# Patient Record
Sex: FEMALE | Race: WHITE | NOT HISPANIC OR LATINO
[De-identification: names, ages, dates, MRNs, and addresses within clinical notes are randomized per-mention and may not be internally consistent; named-entity substitution may affect disease eponyms.]

---

## 2018-02-16 ENCOUNTER — TRANSCRIPTION ENCOUNTER (OUTPATIENT)
Age: 23
End: 2018-02-16

## 2020-09-25 ENCOUNTER — LABORATORY RESULT (OUTPATIENT)
Age: 25
End: 2020-09-25

## 2020-09-25 ENCOUNTER — APPOINTMENT (OUTPATIENT)
Dept: HEMATOLOGY ONCOLOGY | Facility: CLINIC | Age: 25
End: 2020-09-25
Payer: MEDICAID

## 2020-09-25 ENCOUNTER — TRANSCRIPTION ENCOUNTER (OUTPATIENT)
Age: 25
End: 2020-09-25

## 2020-09-25 VITALS
BODY MASS INDEX: 24.27 KG/M2 | RESPIRATION RATE: 14 BRPM | TEMPERATURE: 97.2 F | HEART RATE: 102 BPM | SYSTOLIC BLOOD PRESSURE: 130 MMHG | DIASTOLIC BLOOD PRESSURE: 88 MMHG | HEIGHT: 66 IN | WEIGHT: 151 LBS

## 2020-09-25 DIAGNOSIS — Z80.0 FAMILY HISTORY OF MALIGNANT NEOPLASM OF DIGESTIVE ORGANS: ICD-10-CM

## 2020-09-25 DIAGNOSIS — Z87.891 PERSONAL HISTORY OF NICOTINE DEPENDENCE: ICD-10-CM

## 2020-09-25 LAB
HCT VFR BLD CALC: 41.5 %
HGB BLD-MCNC: 14.2 G/DL
MCHC RBC-ENTMCNC: 29.8 PG
MCHC RBC-ENTMCNC: 34.2 G/DL
MCV RBC AUTO: 87 FL
PLATELET # BLD AUTO: 444 K/UL
PMV BLD: 9 FL
RBC # BLD: 4.77 M/UL
RBC # FLD: 12 %
WBC # FLD AUTO: 10.03 K/UL

## 2020-09-25 PROCEDURE — 99204 OFFICE O/P NEW MOD 45 MIN: CPT

## 2020-09-25 NOTE — HISTORY OF PRESENT ILLNESS
[de-identified] : Ms. AMINATA GARCIA is a 25 year old female here today for evaluation and management of thrombocytosis.\par \par She was referred by Dr. Stephens.  She has been following with him for about 3 years now.  Aminata is a 26yo F with PMHx including Ulcerative Colitis.  She states her PLTs were elevated for the last 2-3 blood checks but she was only just notified of this issue.  She denies any headaches, CP, palpitations or bleeding.  She is not on ASA.  She does report AUB in the form of menorrhagia, which is likely why her iron stores are borderline.  \par \par LAB WORKUP\par (9.4.2020) WBC 7.2, Hgb 12.4, MCV 91.4, , Vit B12 is 451, ironsat 26%, ferritin 35, TSH 0.64, Vit D is 23\par (2018) \par (2017)

## 2020-09-25 NOTE — REVIEW OF SYSTEMS
[Negative] : Allergic/Immunologic [Fever] : no fever [Chills] : no chills [Shortness Of Breath] : no shortness of breath [Skin Rash] : no skin rash [Confused] : no confusion [Dizziness] : no dizziness [Easy Bleeding] : no tendency for easy bleeding

## 2020-09-25 NOTE — CONSULT LETTER
[Dear  ___] : Dear  [unfilled], [Consult Letter:] : I had the pleasure of evaluating your patient, [unfilled]. [Please see my note below.] : Please see my note below. [Consult Closing:] : Thank you very much for allowing me to participate in the care of this patient.  If you have any questions, please do not hesitate to contact me. [Sincerely,] : Sincerely, [FreeTextEntry3] : John Ayala

## 2020-09-25 NOTE — ASSESSMENT
[FreeTextEntry1] : 26 yo woman with PMH of ulcerative colitis and menorrhagia has Thrombocytosis, , stable in 400s since at least 2017\par - consider rather reactive due to inflammation; will obtain mutaion labs : JAK2, CALR, MPL\par  - iron studes; von willebran disease w/u\par - discussed role of BMBx ; not needed at this time, will discuss further if her blood counts begin to worsen\par \par - US Abd ordered to r/o hepatosplenomegaly\par - labwork today including CBC, VWd panel, JAK2/CALR/MPL, Factor 8, ESR, CRP; requested labwork prior to 2017\par \par RTC in 1 month with CBC

## 2020-09-25 NOTE — REASON FOR VISIT
[Initial Consultation] : an initial consultation for [Blood Count Assessment] : blood count assessment [FreeTextEntry2] : High platelets

## 2020-09-26 LAB
CRP SERPL-MCNC: <0.1 MG/DL
ERYTHROCYTE [SEDIMENTATION RATE] IN BLOOD BY WESTERGREN METHOD: 5 MM/HR

## 2020-09-29 LAB — VWF AG PPP IA-ACNC: 85 %

## 2020-09-30 LAB
FACT VIII ACT/NOR PPP: 92 %
GENE XXX MUT ANL BLD/T: NORMAL

## 2020-10-02 LAB — JAK2RLX: NORMAL

## 2020-10-04 LAB
AMINO ACID MPL: NORMAL
ASSAY DETAILS MPL: NORMAL
BLOCK/SPECIMEN ID: NORMAL
EXON MPL: NORMAL
GENE MPL: NORMAL
INTERPRETATION: NORMAL
Lab: NORMAL
MPL EXON 10 MUTATION: NOT DETECTED
MPL SPECIMEN SOURCE: NORMAL
MUTATION TYPE: NORMAL
MUTFREQ: NORMAL
NUCCHA: NORMAL
REFERENCE MPL: NORMAL

## 2020-10-22 LAB — VWF MULTIMERS PPP IA-ACNC: NORMAL

## 2020-10-30 ENCOUNTER — OUTPATIENT (OUTPATIENT)
Dept: OUTPATIENT SERVICES | Facility: HOSPITAL | Age: 25
LOS: 1 days | Discharge: HOME | End: 2020-10-30

## 2020-10-30 ENCOUNTER — APPOINTMENT (OUTPATIENT)
Dept: HEMATOLOGY ONCOLOGY | Facility: CLINIC | Age: 25
End: 2020-10-30
Payer: MEDICAID

## 2020-10-30 VITALS
TEMPERATURE: 98 F | HEART RATE: 103 BPM | DIASTOLIC BLOOD PRESSURE: 83 MMHG | BODY MASS INDEX: 24.11 KG/M2 | RESPIRATION RATE: 14 BRPM | WEIGHT: 150 LBS | HEIGHT: 66 IN | SYSTOLIC BLOOD PRESSURE: 149 MMHG

## 2020-10-30 DIAGNOSIS — D47.3 ESSENTIAL (HEMORRHAGIC) THROMBOCYTHEMIA: ICD-10-CM

## 2020-10-30 LAB
RETICS # AUTO: 1 %
RETICS AGGREG/RBC NFR: 47 K/UL

## 2020-10-30 PROCEDURE — 99214 OFFICE O/P EST MOD 30 MIN: CPT

## 2020-10-30 NOTE — HISTORY OF PRESENT ILLNESS
[de-identified] : Ms. AMINATA GARCIA is a 25 year old female here today for evaluation and management of thrombocytosis.\par \par She was referred by Dr. Stephens.  She has been following with him for about 3 years now.  Aminata is a 24yo F with PMHx including Ulcerative Colitis.  She states her PLTs were elevated for the last 2-3 blood checks but she was only just notified of this issue.  She denies any headaches, CP, palpitations or bleeding.  She is not on ASA.  She does report AUB in the form of menorrhagia, which is likely why her iron stores are borderline.  \par \par LAB WORKUP\par (9.4.2020) WBC 7.2, Hgb 12.4, MCV 91.4, , Vit B12 is 451, ironsat 26%, ferritin 35, TSH 0.64, Vit D is 23\par (2018) \par (2017)  [de-identified] : 10/30/2020\par Patient is here for a follow-up visit for thrombocytosis, accompanied by family member.  She is feeling well with no new complaints.   Patient denies fever, chills, nausea, vomiting, CP or frequent headaches.  Reviewed most recent imaging which shows evidence of fatty liver.  She does report her regular heavy menses but no other bleeding.  \par US Abd (10.2.2020 - RR)IMPRESSION:  Hepatic steatosis. Otherwise, unremarkable exam.

## 2020-10-30 NOTE — REASON FOR VISIT
[Blood Count Assessment] : blood count assessment [Follow-Up Visit] : a follow-up visit for [FreeTextEntry2] : High platelets

## 2020-10-30 NOTE — REVIEW OF SYSTEMS
[Negative] : Allergic/Immunologic [Fever] : no fever [Chills] : no chills [Shortness Of Breath] : no shortness of breath [Skin Rash] : no skin rash [Confused] : no confusion [Dizziness] : no dizziness [Easy Bleeding] : no tendency for easy bleeding [de-identified] : heavy menses

## 2020-11-02 LAB
FERRITIN SERPL-MCNC: 48 NG/ML
IRON SATN MFR SERPL: 40 %
IRON SERPL-MCNC: 129 UG/DL
TIBC SERPL-MCNC: 321 UG/DL
UIBC SERPL-MCNC: 192 UG/DL

## 2020-11-18 ENCOUNTER — TRANSCRIPTION ENCOUNTER (OUTPATIENT)
Age: 25
End: 2020-11-18

## 2020-12-29 ENCOUNTER — TRANSCRIPTION ENCOUNTER (OUTPATIENT)
Age: 25
End: 2020-12-29

## 2021-01-08 ENCOUNTER — TRANSCRIPTION ENCOUNTER (OUTPATIENT)
Age: 26
End: 2021-01-08

## 2021-01-22 ENCOUNTER — TRANSCRIPTION ENCOUNTER (OUTPATIENT)
Age: 26
End: 2021-01-22

## 2021-01-22 ENCOUNTER — APPOINTMENT (OUTPATIENT)
Dept: HEMATOLOGY ONCOLOGY | Facility: CLINIC | Age: 26
End: 2021-01-22

## 2021-03-17 ENCOUNTER — APPOINTMENT (OUTPATIENT)
Dept: HEMATOLOGY ONCOLOGY | Facility: CLINIC | Age: 26
End: 2021-03-17

## 2021-03-22 ENCOUNTER — TRANSCRIPTION ENCOUNTER (OUTPATIENT)
Age: 26
End: 2021-03-22

## 2021-03-26 ENCOUNTER — TRANSCRIPTION ENCOUNTER (OUTPATIENT)
Age: 26
End: 2021-03-26

## 2022-06-07 ENCOUNTER — NON-APPOINTMENT (OUTPATIENT)
Age: 27
End: 2022-06-07

## 2022-06-20 ENCOUNTER — APPOINTMENT (OUTPATIENT)
Dept: OBGYN | Facility: CLINIC | Age: 27
End: 2022-06-20

## 2022-06-20 VITALS
HEIGHT: 66 IN | SYSTOLIC BLOOD PRESSURE: 149 MMHG | DIASTOLIC BLOOD PRESSURE: 91 MMHG | BODY MASS INDEX: 25.71 KG/M2 | WEIGHT: 160 LBS | HEART RATE: 134 BPM

## 2022-06-20 LAB
BILIRUB UR QL STRIP: NORMAL
CLARITY UR: CLEAR
COLLECTION METHOD: NORMAL
GLUCOSE UR-MCNC: NORMAL
HCG UR QL: 0.2 EU/DL
HGB UR QL STRIP.AUTO: NORMAL
KETONES UR-MCNC: NORMAL
LEUKOCYTE ESTERASE UR QL STRIP: NORMAL
NITRITE UR QL STRIP: NORMAL
PH UR STRIP: 5.5
PROT UR STRIP-MCNC: NORMAL
SP GR UR STRIP: 1.01

## 2022-06-20 PROCEDURE — 76830 TRANSVAGINAL US NON-OB: CPT

## 2022-06-20 PROCEDURE — 81003 URINALYSIS AUTO W/O SCOPE: CPT | Mod: QW

## 2022-06-20 PROCEDURE — 99385 PREV VISIT NEW AGE 18-39: CPT | Mod: 25

## 2022-06-20 NOTE — PROCEDURE
[Cervical Pap Smear] : cervical Pap smear [Liquid Base] : liquid base [GC & Chlamydia via Pap] : GC & Chlamydia via Pap [Tolerated Well] : the patient tolerated the procedure well [No Complications] : there were no complications [Fibroid Uterus] : fibroid uterus [Anteverted] : anteverted [L: ___ cm] : L: [unfilled] cm [FreeTextEntry5] : anterior fibroid 20.14cc  [FreeTextEntry7] : 3.98cc [FreeTextEntry8] : 3.58cc

## 2022-06-20 NOTE — HISTORY OF PRESENT ILLNESS
[FreeTextEntry1] : annual visit\par last visit 3 yrs ago [Y] : Patient is sexually active [Monogamous (Male Partner)] : is monogamous with a male partner [Safe Sex] : uses safe sex precautions [N] : Patient denies prior pregnancies [LMPDate] : 6/14/22 [MensesFreq] : 28 [MensesLength] : 7 [MensesAmount] : heavy [de-identified] : total # of partners (8) [Currently Active] : currently active [Men] : men [No] : No [Yes] : Yes [Condoms] : Condoms

## 2022-06-20 NOTE — DISCUSSION/SUMMARY
[FreeTextEntry1] : pt unaware she had fibroid, has not been to this office in 3 yrs\par follow up 6 months to check rate of growth\par pt suffers from severe anxiety, would not permit staff to retake her BP despite it being elevated \par she stated she is seeing her PCP tomorrow and he will check it, I suggested she try medication to help her with her anxiety

## 2022-06-20 NOTE — PHYSICAL EXAM
[Chaperone Present] : A chaperone was present in the examining room during all aspects of the physical examination [Appropriately responsive] : appropriately responsive [Alert] : alert [No Acute Distress] : no acute distress [Soft] : soft [Non-tender] : non-tender [Non-distended] : non-distended [Oriented x3] : oriented x3 [Labia Majora] : normal [Labia Minora] : normal [Moderate] : There was moderate vaginal bleeding [Normal] : normal [Uterine Adnexae] : normal

## 2022-06-23 LAB
C TRACH RRNA SPEC QL NAA+PROBE: NOT DETECTED
HPV HIGH+LOW RISK DNA PNL CVX: NOT DETECTED
N GONORRHOEA RRNA SPEC QL NAA+PROBE: NOT DETECTED
SOURCE AMPLIFICATION: NORMAL

## 2022-06-28 LAB — CYTOLOGY CVX/VAG DOC THIN PREP: NORMAL

## 2022-07-05 ENCOUNTER — APPOINTMENT (OUTPATIENT)
Dept: HEMATOLOGY ONCOLOGY | Facility: CLINIC | Age: 27
End: 2022-07-05

## 2022-07-05 ENCOUNTER — LABORATORY RESULT (OUTPATIENT)
Age: 27
End: 2022-07-05

## 2022-07-05 VITALS
DIASTOLIC BLOOD PRESSURE: 89 MMHG | RESPIRATION RATE: 16 BRPM | BODY MASS INDEX: 25.82 KG/M2 | WEIGHT: 160 LBS | HEART RATE: 131 BPM | TEMPERATURE: 97.2 F | SYSTOLIC BLOOD PRESSURE: 140 MMHG

## 2022-07-05 LAB
HCT VFR BLD CALC: 40.3 %
HGB BLD-MCNC: 13.9 G/DL
MCHC RBC-ENTMCNC: 29.8 PG
MCHC RBC-ENTMCNC: 34.5 G/DL
MCV RBC AUTO: 86.5 FL
PLATELET # BLD AUTO: 160 K/UL
PMV BLD: 10.5 FL
RBC # BLD: 4.66 M/UL
RBC # FLD: 12.2 %
WBC # FLD AUTO: 15.01 K/UL

## 2022-07-05 PROCEDURE — 99214 OFFICE O/P EST MOD 30 MIN: CPT

## 2022-07-05 NOTE — ASSESSMENT
[FreeTextEntry1] : 27 yo woman with PMH of ulcerative colitis and menorrhagia has Thrombocytosis, stable in 400s since at least 2017. \par - JAK2, CALR, MPL negative.  \par - consider reactive due to inflammation\par - reviewed CBC which shows normalization of PLTs , but with mild neutrophil-predoninant leukocytosis \par - discussed role of BMBx ; not needed at this time, will discuss further if her blood counts begin to worsen\par - US Abd from 09/2020 suggestive of hepatic steatosis; given her young age and questionable FH of fatty liver, she went to [facility] for hepatology evaluation where she was reportedly confirmed she does not have fatty liver\par - again, requested labwork prior to 2017 \par - Labwork today:  iron studies, ferritin level \par \par RTC in 6 months with CBC, iron studies, ferritin level 2 days prior\par \par seen/examined w/ NP King;note reviewed; case dscussed\par Thromocytosis appears to be reactive; f/u in 6 mos\par Leukocytosis appears to be reactive, f/u in 6 mos\par will check iron labs and replace accordingly

## 2022-07-05 NOTE — REVIEW OF SYSTEMS
[Negative] : Allergic/Immunologic [Fever] : no fever [Chills] : no chills [Shortness Of Breath] : no shortness of breath [Skin Rash] : no skin rash [Confused] : no confusion [Dizziness] : no dizziness [Easy Bleeding] : no tendency for easy bleeding [de-identified] : heavy menses

## 2022-07-05 NOTE — REASON FOR VISIT
[Follow-Up Visit] : a follow-up visit for [Blood Count Assessment] : blood count assessment [FreeTextEntry2] : High platelets

## 2022-07-05 NOTE — HISTORY OF PRESENT ILLNESS
[de-identified] : Ms. AMINATA GARCIA is a 25 year old female here today for evaluation and management of thrombocytosis.\par \par She was referred by Dr. Stephens.  She has been following with him for about 3 years now.  Aminata is a 26yo F with PMHx including Ulcerative Colitis.  She states her PLTs were elevated for the last 2-3 blood checks but she was only just notified of this issue.  She denies any headaches, CP, palpitations or bleeding.  She is not on ASA.  She does report AUB in the form of menorrhagia, which is likely why her iron stores are borderline.  \par \par LAB WORKUP\par (9.4.2020) WBC 7.2, Hgb 12.4, MCV 91.4, , Vit B12 is 451, ironsat 26%, ferritin 35, TSH 0.64, Vit D is 23\par (2018) \par (2017)  [de-identified] : 10/30/2020\par Patient is here for a follow-up visit for thrombocytosis, accompanied by family member.  She is feeling well with no new complaints.   Patient denies fever, chills, nausea, vomiting, CP or frequent headaches.  Reviewed most recent imaging which shows evidence of fatty liver.  She does report her regular heavy menses but no other bleeding.  \par US Abd (10.2.2020 - RR)IMPRESSION:  Hepatic steatosis. Otherwise, unremarkable exam.\par \par 7/5/22\par Patient is here for a follow-up visit for thrombocytosis, accompanied by mother.  Reviewed most recent CBC which shows neutrophil-predominant leukocytosis without evidence of thrombocytosis.  She states that 2 weeks ago she had PLT count up to 547K about 2 weeks ago with PCP.  She follows with GYN who recently diagnosed her with uterine fibroids.  Last UC flare was approximately 4 months ago.  Patient denies fever, chills, nausea, vomiting, CP or frequent headaches.  She still endorses AUB with menorrhagia , changes tampon up to 8x/day.

## 2022-07-06 LAB
FERRITIN SERPL-MCNC: 22 NG/ML
IRON SATN MFR SERPL: 13 %
IRON SERPL-MCNC: 47 UG/DL
TIBC SERPL-MCNC: 352 UG/DL
UIBC SERPL-MCNC: 305 UG/DL

## 2022-07-13 ENCOUNTER — APPOINTMENT (OUTPATIENT)
Dept: INFUSION THERAPY | Facility: CLINIC | Age: 27
End: 2022-07-13

## 2022-07-13 RX ORDER — IRON SUCROSE 20 MG/ML
200 INJECTION, SOLUTION INTRAVENOUS ONCE
Refills: 0 | Status: COMPLETED | OUTPATIENT
Start: 2022-07-13 | End: 2022-07-13

## 2022-07-13 RX ADMIN — IRON SUCROSE 220 MILLIGRAM(S): 20 INJECTION, SOLUTION INTRAVENOUS at 09:05

## 2022-07-13 RX ADMIN — IRON SUCROSE 200 MILLIGRAM(S): 20 INJECTION, SOLUTION INTRAVENOUS at 09:35

## 2022-07-20 ENCOUNTER — APPOINTMENT (OUTPATIENT)
Dept: INFUSION THERAPY | Facility: CLINIC | Age: 27
End: 2022-07-20

## 2022-07-20 RX ORDER — IRON SUCROSE 20 MG/ML
200 INJECTION, SOLUTION INTRAVENOUS ONCE
Refills: 0 | Status: COMPLETED | OUTPATIENT
Start: 2022-07-20 | End: 2022-07-20

## 2022-07-20 RX ADMIN — IRON SUCROSE 220 MILLIGRAM(S): 20 INJECTION, SOLUTION INTRAVENOUS at 09:14

## 2022-07-27 ENCOUNTER — APPOINTMENT (OUTPATIENT)
Dept: INFUSION THERAPY | Facility: CLINIC | Age: 27
End: 2022-07-27

## 2022-07-27 RX ORDER — IRON SUCROSE 20 MG/ML
200 INJECTION, SOLUTION INTRAVENOUS ONCE
Refills: 0 | Status: COMPLETED | OUTPATIENT
Start: 2022-07-27 | End: 2022-07-27

## 2022-07-27 RX ADMIN — IRON SUCROSE 220 MILLIGRAM(S): 20 INJECTION, SOLUTION INTRAVENOUS at 09:42

## 2022-08-17 ENCOUNTER — APPOINTMENT (OUTPATIENT)
Dept: INFUSION THERAPY | Facility: CLINIC | Age: 27
End: 2022-08-17

## 2022-08-17 RX ORDER — IRON SUCROSE 20 MG/ML
200 INJECTION, SOLUTION INTRAVENOUS ONCE
Refills: 0 | Status: COMPLETED | OUTPATIENT
Start: 2022-08-17 | End: 2022-08-17

## 2022-08-17 RX ADMIN — IRON SUCROSE 220 MILLIGRAM(S): 20 INJECTION, SOLUTION INTRAVENOUS at 08:57

## 2022-08-24 ENCOUNTER — APPOINTMENT (OUTPATIENT)
Dept: INFUSION THERAPY | Facility: CLINIC | Age: 27
End: 2022-08-24

## 2022-08-24 VITALS
RESPIRATION RATE: 18 BRPM | DIASTOLIC BLOOD PRESSURE: 72 MMHG | HEART RATE: 103 BPM | TEMPERATURE: 97.4 F | SYSTOLIC BLOOD PRESSURE: 136 MMHG

## 2022-08-24 RX ORDER — IRON SUCROSE 20 MG/ML
200 INJECTION, SOLUTION INTRAVENOUS ONCE
Refills: 0 | Status: COMPLETED | OUTPATIENT
Start: 2022-08-24 | End: 2022-08-24

## 2022-08-24 RX ADMIN — IRON SUCROSE 220 MILLIGRAM(S): 20 INJECTION, SOLUTION INTRAVENOUS at 09:41

## 2022-08-24 RX ADMIN — IRON SUCROSE 200 MILLIGRAM(S): 20 INJECTION, SOLUTION INTRAVENOUS at 10:35

## 2022-09-26 ENCOUNTER — APPOINTMENT (OUTPATIENT)
Dept: HEMATOLOGY ONCOLOGY | Facility: CLINIC | Age: 27
End: 2022-09-26

## 2022-09-27 LAB
BASOPHILS # BLD AUTO: 0.08 K/UL
BASOPHILS NFR BLD AUTO: 1.1 %
EOSINOPHIL # BLD AUTO: 0.37 K/UL
EOSINOPHIL NFR BLD AUTO: 5.3 %
FERRITIN SERPL-MCNC: 243 NG/ML
HCT VFR BLD CALC: 38.7 %
HGB BLD-MCNC: 13.5 G/DL
IMM GRANULOCYTES NFR BLD AUTO: 0.4 %
IRON SATN MFR SERPL: 45 %
IRON SERPL-MCNC: 130 UG/DL
LYMPHOCYTES # BLD AUTO: 2.6 K/UL
LYMPHOCYTES NFR BLD AUTO: 37.2 %
MAN DIFF?: NORMAL
MCHC RBC-ENTMCNC: 30.3 PG
MCHC RBC-ENTMCNC: 34.9 G/DL
MCV RBC AUTO: 87 FL
MONOCYTES # BLD AUTO: 0.65 K/UL
MONOCYTES NFR BLD AUTO: 9.3 %
NEUTROPHILS # BLD AUTO: 3.26 K/UL
NEUTROPHILS NFR BLD AUTO: 46.7 %
PLATELET # BLD AUTO: 437 K/UL
RBC # BLD: 4.45 M/UL
RBC # FLD: 13.2 %
TIBC SERPL-MCNC: 289 UG/DL
UIBC SERPL-MCNC: 159 UG/DL
WBC # FLD AUTO: 6.99 K/UL

## 2022-09-28 ENCOUNTER — OUTPATIENT (OUTPATIENT)
Dept: OUTPATIENT SERVICES | Facility: HOSPITAL | Age: 27
LOS: 1 days | Discharge: HOME | End: 2022-09-28

## 2022-09-28 ENCOUNTER — APPOINTMENT (OUTPATIENT)
Dept: HEMATOLOGY ONCOLOGY | Facility: CLINIC | Age: 27
End: 2022-09-28

## 2022-09-28 VITALS
DIASTOLIC BLOOD PRESSURE: 87 MMHG | WEIGHT: 160 LBS | SYSTOLIC BLOOD PRESSURE: 153 MMHG | HEART RATE: 131 BPM | HEIGHT: 66 IN | TEMPERATURE: 97 F | BODY MASS INDEX: 25.71 KG/M2

## 2022-09-28 DIAGNOSIS — D75.839 THROMBOCYTOSIS, UNSPECIFIED: ICD-10-CM

## 2022-09-28 PROCEDURE — 99214 OFFICE O/P EST MOD 30 MIN: CPT

## 2022-09-28 NOTE — HISTORY OF PRESENT ILLNESS
[de-identified] : Ms. AMINATA GARCIA is a 25 year old female here today for evaluation and management of thrombocytosis.\par \par She was referred by Dr. Stephens.  She has been following with him for about 3 years now.  Aminata is a 24yo F with PMHx including Ulcerative Colitis.  She states her PLTs were elevated for the last 2-3 blood checks but she was only just notified of this issue.  She denies any headaches, CP, palpitations or bleeding.  She is not on ASA.  She does report AUB in the form of menorrhagia, which is likely why her iron stores are borderline.  \par \par LAB WORKUP\par (9.4.2020) WBC 7.2, Hgb 12.4, MCV 91.4, , Vit B12 is 451, ironsat 26%, ferritin 35, TSH 0.64, Vit D is 23\par (2018) \par (2017)  [de-identified] : 10/30/2020\par Patient is here for a follow-up visit for thrombocytosis, accompanied by family member.  She is feeling well with no new complaints.   Patient denies fever, chills, nausea, vomiting, CP or frequent headaches.  Reviewed most recent imaging which shows evidence of fatty liver.  She does report her regular heavy menses but no other bleeding.  \par US Abd (10.2.2020 - RR)IMPRESSION:  Hepatic steatosis. Otherwise, unremarkable exam.\par \par 7/5/22\par Patient is here for a follow-up visit for thrombocytosis, accompanied by mother.  Reviewed most recent CBC which shows neutrophil-predominant leukocytosis without evidence of thrombocytosis.  She states that 2 weeks ago she had PLT count up to 547K about 2 weeks ago with PCP.  She follows with GYN who recently diagnosed her with uterine fibroids.  Last UC flare was approximately 4 months ago.  Patient denies fever, chills, nausea, vomiting, CP or frequent headaches.  She still endorses AUB with menorrhagia , changes tampon up to 8x/day.

## 2022-09-28 NOTE — ASSESSMENT
[FreeTextEntry1] : 25 yo woman with PMH of ulcerative colitis and menorrhagia has Thrombocytosis, stable in 400s since at least 2017. \par - JAK2, CALR, MPL negative.  \par - consider reactive due to inflammation\par - reviewed CBC which shows normalization of PLTs , but with mild neutrophil-predoninant leukocytosis \par - discussed role of BMBx ; not needed at this time, will discuss further if her blood counts begin to worsen\par - US Abd from 09/2020 suggestive of hepatic steatosis; given her young age and questionable FH of fatty liver, she went to [facility] for hepatology evaluation where she was reportedly confirmed she does not have fatty liver\par - again, requested labwork prior to 2017 \par - Labwork today:  iron studies, ferritin level \par \par \par Thromocytosis appears to be reactive; f/u in 3 mos\par Leukocytosis appears to be reactive, f/u in 3 mos\par iron deficiency due to menorrhagia secondary to fibroid: s/p iron IV with good response; needs to see GYN to discuss options of management

## 2022-09-28 NOTE — REVIEW OF SYSTEMS
[Negative] : Allergic/Immunologic [Fever] : no fever [Chills] : no chills [Shortness Of Breath] : no shortness of breath [Skin Rash] : no skin rash [Confused] : no confusion [Dizziness] : no dizziness [Easy Bleeding] : no tendency for easy bleeding [de-identified] : heavy menses

## 2022-11-28 ENCOUNTER — APPOINTMENT (OUTPATIENT)
Dept: HEMATOLOGY ONCOLOGY | Facility: CLINIC | Age: 27
End: 2022-11-28

## 2022-11-28 DIAGNOSIS — Z00.00 ENCOUNTER FOR GENERAL ADULT MEDICAL EXAMINATION W/OUT ABNORMAL FINDINGS: ICD-10-CM

## 2022-11-29 LAB
BASOPHILS # BLD AUTO: 0.08 K/UL
BASOPHILS NFR BLD AUTO: 0.9 %
EOSINOPHIL # BLD AUTO: 0.36 K/UL
EOSINOPHIL NFR BLD AUTO: 4.1 %
FERRITIN SERPL-MCNC: 159 NG/ML
HCT VFR BLD CALC: 38.8 %
HGB BLD-MCNC: 12.8 G/DL
IMM GRANULOCYTES NFR BLD AUTO: 0.2 %
IRON SATN MFR SERPL: 37 %
IRON SERPL-MCNC: 112 UG/DL
LYMPHOCYTES # BLD AUTO: 2.65 K/UL
LYMPHOCYTES NFR BLD AUTO: 29.9 %
MAN DIFF?: NORMAL
MCHC RBC-ENTMCNC: 29.4 PG
MCHC RBC-ENTMCNC: 33 G/DL
MCV RBC AUTO: 89.2 FL
MONOCYTES # BLD AUTO: 0.83 K/UL
MONOCYTES NFR BLD AUTO: 9.4 %
NEUTROPHILS # BLD AUTO: 4.92 K/UL
NEUTROPHILS NFR BLD AUTO: 55.5 %
PLATELET # BLD AUTO: 516 K/UL
RBC # BLD: 4.35 M/UL
RBC # FLD: 12.2 %
TIBC SERPL-MCNC: 301 UG/DL
UIBC SERPL-MCNC: 189 UG/DL
WBC # FLD AUTO: 8.86 K/UL

## 2022-12-01 ENCOUNTER — APPOINTMENT (OUTPATIENT)
Dept: HEMATOLOGY ONCOLOGY | Facility: CLINIC | Age: 27
End: 2022-12-01

## 2022-12-01 PROCEDURE — 99214 OFFICE O/P EST MOD 30 MIN: CPT | Mod: 95

## 2022-12-01 NOTE — REVIEW OF SYSTEMS
[Fever] : no fever [Chills] : no chills [Shortness Of Breath] : no shortness of breath [Skin Rash] : no skin rash [Confused] : no confusion [Dizziness] : no dizziness [Easy Bleeding] : no tendency for easy bleeding [Negative] : Allergic/Immunologic [de-identified] : heavy menses

## 2022-12-01 NOTE — REVIEW OF SYSTEMS
[Fever] : no fever [Chills] : no chills [Shortness Of Breath] : no shortness of breath [Skin Rash] : no skin rash [Confused] : no confusion [Dizziness] : no dizziness [Easy Bleeding] : no tendency for easy bleeding [Negative] : Allergic/Immunologic [de-identified] : heavy menses

## 2022-12-01 NOTE — HISTORY OF PRESENT ILLNESS
[de-identified] : Ms. AMINATA GARCIA is a 27 year old female here today for evaluation and management of thrombocytosis.\par \par She was referred by Dr. Stephens.  She has been following with him for about 3 years now.  Aminata is a 24yo F with PMHx including Ulcerative Colitis.  She states her PLTs were elevated for the last 2-3 blood checks but she was only just notified of this issue.  She denies any headaches, CP, palpitations or bleeding.  She is not on ASA.  She does report AUB in the form of menorrhagia, which is likely why her iron stores are borderline.  \par \par LAB WORKUP\par (9.4.2020) WBC 7.2, Hgb 12.4, MCV 91.4, , Vit B12 is 451, ironsat 26%, ferritin 35, TSH 0.64, Vit D is 23\par (2018) \par (2017)  [de-identified] : 10/30/2020\par Patient is here for a follow-up visit for thrombocytosis, accompanied by family member.  She is feeling well with no new complaints.   Patient denies fever, chills, nausea, vomiting, CP or frequent headaches.  Reviewed most recent imaging which shows evidence of fatty liver.  She does report her regular heavy menses but no other bleeding.  \par US Abd (10.2.2020 - RR)IMPRESSION:  Hepatic steatosis. Otherwise, unremarkable exam.\par \par 7/5/22\par Patient is here for a follow-up visit for thrombocytosis, accompanied by mother.  Reviewed most recent CBC which shows neutrophil-predominant leukocytosis without evidence of thrombocytosis.  She states that 2 weeks ago she had PLT count up to 547K about 2 weeks ago with PCP.  She follows with GYN who recently diagnosed her with uterine fibroids.  Last UC flare was approximately 4 months ago.  Patient denies fever, chills, nausea, vomiting, CP or frequent headaches.  She still endorses AUB with menorrhagia , changes tampon up to 8x/day.   [Home] : at home, [unfilled] , at the time of the visit. [Medical Office: ([facility])___] : at the medical office located in  [Verbal consent obtained from patient] : the patient, [unfilled]

## 2022-12-01 NOTE — HISTORY OF PRESENT ILLNESS
[de-identified] : Ms. AMINATA GARCIA is a 27 year old female here today for evaluation and management of thrombocytosis.\par \par She was referred by Dr. Stephens.  She has been following with him for about 3 years now.  Aminata is a 26yo F with PMHx including Ulcerative Colitis.  She states her PLTs were elevated for the last 2-3 blood checks but she was only just notified of this issue.  She denies any headaches, CP, palpitations or bleeding.  She is not on ASA.  She does report AUB in the form of menorrhagia, which is likely why her iron stores are borderline.  \par \par LAB WORKUP\par (9.4.2020) WBC 7.2, Hgb 12.4, MCV 91.4, , Vit B12 is 451, ironsat 26%, ferritin 35, TSH 0.64, Vit D is 23\par (2018) \par (2017)  [de-identified] : 10/30/2020\par Patient is here for a follow-up visit for thrombocytosis, accompanied by family member.  She is feeling well with no new complaints.   Patient denies fever, chills, nausea, vomiting, CP or frequent headaches.  Reviewed most recent imaging which shows evidence of fatty liver.  She does report her regular heavy menses but no other bleeding.  \par US Abd (10.2.2020 - RR)IMPRESSION:  Hepatic steatosis. Otherwise, unremarkable exam.\par \par 7/5/22\par Patient is here for a follow-up visit for thrombocytosis, accompanied by mother.  Reviewed most recent CBC which shows neutrophil-predominant leukocytosis without evidence of thrombocytosis.  She states that 2 weeks ago she had PLT count up to 547K about 2 weeks ago with PCP.  She follows with GYN who recently diagnosed her with uterine fibroids.  Last UC flare was approximately 4 months ago.  Patient denies fever, chills, nausea, vomiting, CP or frequent headaches.  She still endorses AUB with menorrhagia , changes tampon up to 8x/day.   [Home] : at home, [unfilled] , at the time of the visit. [Medical Office: ([facility])___] : at the medical office located in  [Verbal consent obtained from patient] : the patient, [unfilled]

## 2022-12-01 NOTE — CONSULT LETTER
[Dear  ___] : Dear  [unfilled], [Consult Letter:] : I had the pleasure of evaluating your patient, [unfilled]. [Please see my note below.] : Please see my note below. [Consult Closing:] : Thank you very much for allowing me to participate in the care of this patient.  If you have any questions, please do not hesitate to contact me. [Sincerely,] : Sincerely, [FreeTextEntry3] : John Ayala DO\par Attending Physician,\par Hematology/ Medical Oncology\par [phone] office\par \par

## 2022-12-01 NOTE — CONSULT LETTER
[Dear  ___] : Dear  [unfilled], [Consult Letter:] : I had the pleasure of evaluating your patient, [unfilled]. [Please see my note below.] : Please see my note below. [Consult Closing:] : Thank you very much for allowing me to participate in the care of this patient.  If you have any questions, please do not hesitate to contact me. [Sincerely,] : Sincerely, [FreeTextEntry3] : John Ayala DO\par Attending Physician,\par Hematology/ Medical Oncology\par [phone] office\par \par

## 2022-12-01 NOTE — ASSESSMENT
[FreeTextEntry1] : 26 yo woman with PMH of ulcerative colitis and menorrhagia has Thrombocytosis, stable in 400s since at least 2017. \par - JAK2, CALR, MPL negative.  \par - consider reactive due to inflammation\par \par - discussed role of BMBx ; not needed at this time, will discuss further if her blood counts begin to worsen\par \par - US Abd from 09/2020 suggestive of hepatic steatosis; given her young age and questionable FH of fatty liver, she went to [facility] for hepatology evaluation : FIBROSCAN done 04/2021 and showed E5.1, , IQR 14%\par \par \par Thromocytosis appears to be reactive; f/u in 3 mos\par Leukocytosis appears to be reactive, f/u in 3 mos\par iron deficiency due to menorrhagia secondary to fibroid: s/p iron IV with good response; needs to see GYN to discuss options of management\par \par PLAN \par - labs in 6 weeks (2 weeks after the menstrual period\par - labs in 10 weeks to see the CBC trend

## 2022-12-02 LAB — METHYLMALONATE SERPL-SCNC: 181 NMOL/L

## 2022-12-19 ENCOUNTER — APPOINTMENT (OUTPATIENT)
Dept: OBGYN | Facility: CLINIC | Age: 27
End: 2022-12-19

## 2022-12-19 VITALS
DIASTOLIC BLOOD PRESSURE: 82 MMHG | WEIGHT: 160 LBS | BODY MASS INDEX: 25.71 KG/M2 | HEIGHT: 66 IN | SYSTOLIC BLOOD PRESSURE: 128 MMHG | HEART RATE: 101 BPM

## 2022-12-19 DIAGNOSIS — N92.0 EXCESSIVE AND FREQUENT MENSTRUATION WITH REGULAR CYCLE: ICD-10-CM

## 2022-12-19 PROCEDURE — 99213 OFFICE O/P EST LOW 20 MIN: CPT | Mod: 25

## 2022-12-19 PROCEDURE — 76830 TRANSVAGINAL US NON-OB: CPT

## 2022-12-19 NOTE — DISCUSSION/SUMMARY
[FreeTextEntry1] : stable appearing fibroid, no significant growth since last visit 6 months ago\par discussed Mirena IUD to manage menorrhagia but pt declined at todays visit, pt states she can "deal with the heavy bleeding" \par seems very concerned about the etiology of her thrombocytosis \par pt being followed by HEM and GI for UC\par instructed to call office if she becomes symptomatic, pain , presssure

## 2022-12-19 NOTE — HISTORY OF PRESENT ILLNESS
[FreeTextEntry1] : pt presents for follow up visit, measurement of fibroid\par pt is asymptomatic, fibroid is anterior in location, denies pain, pressure or frequency of urination\par pt being followed by hematology for elevated platelets ( 450-470,000) \par  [Y] : Patient is sexually active [Monogamous (Male Partner)] : is monogamous with a male partner [Safe Sex] : uses safe sex precautions [N] : Patient denies prior pregnancies [LMPDate] : 11/21/22 [MensesFreq] : 28 [MensesLength] : 7 [MensesAmount] : heavy [de-identified] : total # of partners (8) [Regular Cycle Intervals] : periods have been regular [Currently Active] : currently active [Men] : men [No] : No [Yes] : Yes [Condoms] : Condoms

## 2022-12-19 NOTE — PROCEDURE
[Fibroid Uterus] : fibroid uterus [Abnormal Uterine Bleeding] : abnormal uterine bleeding [Transvaginal Ultrasound] : transvaginal ultrasound [Anteverted] : anteverted [FreeTextEntry5] : fibroid measured today 24.63 cc vol ( see report) [FreeTextEntry7] : NV [FreeTextEntry8] : 8.20cc

## 2022-12-19 NOTE — PHYSICAL EXAM
[Chaperone Present] : A chaperone was present in the examining room during all aspects of the physical examination [Appropriately responsive] : appropriately responsive [Alert] : alert [No Acute Distress] : no acute distress [Soft] : soft [Non-tender] : non-tender [Non-distended] : non-distended [Oriented x3] : oriented x3 [Labia Majora] : normal [Labia Minora] : normal [Normal] : normal [Enlarged ___ wks] : enlarged [unfilled] ~Uweeks [Anteversion] : anteverted [Mass ___ cm] : a [unfilled] ~Ucm uterine mass was palpated [Uterine Adnexae] : normal

## 2023-01-05 ENCOUNTER — APPOINTMENT (OUTPATIENT)
Dept: HEMATOLOGY ONCOLOGY | Facility: CLINIC | Age: 28
End: 2023-01-05

## 2023-01-10 ENCOUNTER — APPOINTMENT (OUTPATIENT)
Dept: HEMATOLOGY ONCOLOGY | Facility: CLINIC | Age: 28
End: 2023-01-10

## 2023-01-19 ENCOUNTER — APPOINTMENT (OUTPATIENT)
Dept: HEMATOLOGY ONCOLOGY | Facility: CLINIC | Age: 28
End: 2023-01-19

## 2023-01-19 ENCOUNTER — OUTPATIENT (OUTPATIENT)
Dept: OUTPATIENT SERVICES | Facility: HOSPITAL | Age: 28
LOS: 1 days | End: 2023-01-19

## 2023-01-19 ENCOUNTER — LABORATORY RESULT (OUTPATIENT)
Age: 28
End: 2023-01-19

## 2023-01-19 DIAGNOSIS — D75.839 THROMBOCYTOSIS, UNSPECIFIED: ICD-10-CM

## 2023-01-20 LAB
HCT VFR BLD CALC: 38.6 %
HGB BLD-MCNC: 13.3 G/DL
MCHC RBC-ENTMCNC: 29.7 PG
MCHC RBC-ENTMCNC: 34.5 G/DL
MCV RBC AUTO: 86.2 FL
PLATELET # BLD AUTO: 274 K/UL
PMV BLD: 10.4 FL
RBC # BLD: 4.48 M/UL
RBC # FLD: 12 %
WBC # FLD AUTO: 12.25 K/UL

## 2023-02-13 ENCOUNTER — APPOINTMENT (OUTPATIENT)
Age: 28
End: 2023-02-13

## 2023-02-13 ENCOUNTER — APPOINTMENT (OUTPATIENT)
Dept: HEMATOLOGY ONCOLOGY | Facility: CLINIC | Age: 28
End: 2023-02-13

## 2023-02-16 ENCOUNTER — APPOINTMENT (OUTPATIENT)
Dept: HEMATOLOGY ONCOLOGY | Facility: CLINIC | Age: 28
End: 2023-02-16

## 2023-02-21 ENCOUNTER — APPOINTMENT (OUTPATIENT)
Dept: HEMATOLOGY ONCOLOGY | Facility: CLINIC | Age: 28
End: 2023-02-21

## 2023-02-23 ENCOUNTER — OUTPATIENT (OUTPATIENT)
Dept: OUTPATIENT SERVICES | Facility: HOSPITAL | Age: 28
LOS: 1 days | End: 2023-02-23
Payer: MEDICAID

## 2023-02-23 ENCOUNTER — APPOINTMENT (OUTPATIENT)
Dept: HEMATOLOGY ONCOLOGY | Facility: CLINIC | Age: 28
End: 2023-02-23
Payer: MEDICAID

## 2023-02-23 PROCEDURE — 99214 OFFICE O/P EST MOD 30 MIN: CPT | Mod: 95

## 2023-02-23 NOTE — HISTORY OF PRESENT ILLNESS
[Home] : at home, [unfilled] , at the time of the visit. [Medical Office: ([facility])___] : at the medical office located in  [Verbal consent obtained from patient] : the patient, [unfilled] [de-identified] : 10/30/2020\par Patient is here for a follow-up visit for thrombocytosis, accompanied by family member.  She is feeling well with no new complaints.   Patient denies fever, chills, nausea, vomiting, CP or frequent headaches.  Reviewed most recent imaging which shows evidence of fatty liver.  She does report her regular heavy menses but no other bleeding.  \par US Abd (10.2.2020 - RR)IMPRESSION:  Hepatic steatosis. Otherwise, unremarkable exam.\par \par 7/5/22\par Patient is here for a follow-up visit for thrombocytosis, accompanied by mother.  Reviewed most recent CBC which shows neutrophil-predominant leukocytosis without evidence of thrombocytosis.  She states that 2 weeks ago she had PLT count up to 547K about 2 weeks ago with PCP.  She follows with GYN who recently diagnosed her with uterine fibroids.  Last UC flare was approximately 4 months ago.  Patient denies fever, chills, nausea, vomiting, CP or frequent headaches.  She still endorses AUB with menorrhagia , changes tampon up to 8x/day.   [de-identified] : Ms. AMINATA GARCIA is a 27 year old female here today for evaluation and management of thrombocytosis.\par \par She was referred by Dr. Stephens.  She has been following with him for about 3 years now.  Aminata is a 26yo F with PMHx including Ulcerative Colitis.  She states her PLTs were elevated for the last 2-3 blood checks but she was only just notified of this issue.  She denies any headaches, CP, palpitations or bleeding.  She is not on ASA.  She does report AUB in the form of menorrhagia, which is likely why her iron stores are borderline.  \par \par LAB WORKUP\par (9.4.2020) WBC 7.2, Hgb 12.4, MCV 91.4, , Vit B12 is 451, ironsat 26%, ferritin 35, TSH 0.64, Vit D is 23\par (2018) \par (2017)

## 2023-02-23 NOTE — CONSULT LETTER
[Dear  ___] : Dear  [unfilled], [Consult Letter:] : I had the pleasure of evaluating your patient, [unfilled]. [Please see my note below.] : Please see my note below. [Consult Closing:] : Thank you very much for allowing me to participate in the care of this patient.  If you have any questions, please do not hesitate to contact me. [Sincerely,] : Sincerely, [FreeTextEntry3] : John Ayala DO\par Attending Physician,\par Hematology/ Medical Oncology\par [phone] office\par \par

## 2023-02-23 NOTE — REVIEW OF SYSTEMS
[Negative] : Allergic/Immunologic [Fever] : no fever [Chills] : no chills [Shortness Of Breath] : no shortness of breath [Skin Rash] : no skin rash [Confused] : no confusion [Dizziness] : no dizziness [Easy Bleeding] : no tendency for easy bleeding [de-identified] : heavy menses

## 2023-02-23 NOTE — ASSESSMENT
[FreeTextEntry1] : 28 yo woman with PMH of ulcerative colitis and menorrhagia has Thrombocytosis, stable in 400s since at least 2017. \par - JAK2, CALR, MPL negative.  \par - consider reactive due to inflammation\par \par - discussed role of BMBx ; not needed at this time, will discuss further if her blood counts begin to worsen\par \par - US Abd from 09/2020 suggestive of hepatic steatosis; given her young age and questionable FH of fatty liver, she went to [facility] for hepatology evaluation : FIBROSCAN done 04/2021 and showed E5.1, , IQR 14%\par \par \par Thromocytosis appears to be reactive; f/u in 3 mos\par Leukocytosis appears to be reactive, f/u in 3 mos\par iron deficiency due to menorrhagia secondary to fibroid: s/p iron IV with good response; needs to see GYN to discuss options of management\par \par PLAN \par -platelets count should be repeated in the middle of menstrual cycle to avoid the effect of mentsturatin\par -went over the labs and this appears that when it is done mid cycle, platelets count \par - even if platelets are slightly increased it is due to menorrhagia and UC

## 2023-04-04 DIAGNOSIS — D75.839 THROMBOCYTOSIS, UNSPECIFIED: ICD-10-CM

## 2023-04-04 DIAGNOSIS — D25.9 LEIOMYOMA OF UTERUS, UNSPECIFIED: ICD-10-CM

## 2023-04-05 DIAGNOSIS — D75.839 THROMBOCYTOSIS, UNSPECIFIED: ICD-10-CM

## 2023-04-05 DIAGNOSIS — D25.9 LEIOMYOMA OF UTERUS, UNSPECIFIED: ICD-10-CM

## 2023-04-18 ENCOUNTER — APPOINTMENT (OUTPATIENT)
Dept: HEMATOLOGY ONCOLOGY | Facility: CLINIC | Age: 28
End: 2023-04-18

## 2023-04-18 ENCOUNTER — LABORATORY RESULT (OUTPATIENT)
Age: 28
End: 2023-04-18

## 2023-04-18 ENCOUNTER — OUTPATIENT (OUTPATIENT)
Dept: OUTPATIENT SERVICES | Facility: HOSPITAL | Age: 28
LOS: 1 days | End: 2023-04-18
Payer: MEDICAID

## 2023-04-18 DIAGNOSIS — D75.839 THROMBOCYTOSIS, UNSPECIFIED: ICD-10-CM

## 2023-04-18 LAB
HCT VFR BLD CALC: 35.9 %
HGB BLD-MCNC: 12.2 G/DL
MCHC RBC-ENTMCNC: 29.8 PG
MCHC RBC-ENTMCNC: 34 G/DL
MCV RBC AUTO: 87.8 FL
PLATELET # BLD AUTO: 451 K/UL
PMV BLD: 9.2 FL
RBC # BLD: 4.09 M/UL
RBC # FLD: 12.5 %
WBC # FLD AUTO: 10.05 K/UL

## 2023-04-18 PROCEDURE — 82728 ASSAY OF FERRITIN: CPT

## 2023-04-18 PROCEDURE — 85027 COMPLETE CBC AUTOMATED: CPT

## 2023-04-18 PROCEDURE — 83550 IRON BINDING TEST: CPT

## 2023-04-18 PROCEDURE — 83540 ASSAY OF IRON: CPT

## 2023-04-19 DIAGNOSIS — D75.839 THROMBOCYTOSIS, UNSPECIFIED: ICD-10-CM

## 2023-04-19 LAB
FERRITIN SERPL-MCNC: 127 NG/ML
IRON SATN MFR SERPL: 37 %
IRON SERPL-MCNC: 114 UG/DL
TIBC SERPL-MCNC: 311 UG/DL
UIBC SERPL-MCNC: 197 UG/DL

## 2023-04-20 ENCOUNTER — APPOINTMENT (OUTPATIENT)
Dept: HEMATOLOGY ONCOLOGY | Facility: CLINIC | Age: 28
End: 2023-04-20
Payer: MEDICAID

## 2023-04-20 ENCOUNTER — OUTPATIENT (OUTPATIENT)
Dept: OUTPATIENT SERVICES | Facility: HOSPITAL | Age: 28
LOS: 1 days | End: 2023-04-20
Payer: MEDICAID

## 2023-04-20 DIAGNOSIS — D75.839 THROMBOCYTOSIS, UNSPECIFIED: ICD-10-CM

## 2023-04-20 PROCEDURE — 99214 OFFICE O/P EST MOD 30 MIN: CPT | Mod: 95

## 2023-04-20 NOTE — ASSESSMENT
[FreeTextEntry1] : 26 yo woman with PMH of ulcerative colitis and menorrhagia has Thrombocytosis, stable in 400s since at least 2017. \par - JAK2, CALR, MPL negative.  \par - consider reactive due to inflammation\par \par - discussed role of BMBx ; not needed at this time, will discuss further if her blood counts begin to worsen\par \par - US Abd from 09/2020 suggestive of hepatic steatosis; given her young age and questionable FH of fatty liver, she went to [facility] for hepatology evaluation : FIBROSCAN done 04/2021 and showed E5.1, , IQR 14%\par \par \par Thromocytosis appears to be reactive; f/u in 3 mos\par Leukocytosis appears to be reactive, f/u in 3 mos\par iron deficiency due to menorrhagia secondary to fibroid: s/p iron IV with good response; needs to see GYN to discuss options of management\par \par PLAN \par - platelets count is mildly increased but stable\par -went over the labs and this appears that when it is done mid cycle, platelets count \par - even if platelets are slightly increased it is due to menorrhagia and UC\par f/u 6 mos

## 2023-04-20 NOTE — REVIEW OF SYSTEMS
[Negative] : Allergic/Immunologic [Fever] : no fever [Chills] : no chills [Shortness Of Breath] : no shortness of breath [Skin Rash] : no skin rash [Confused] : no confusion [Dizziness] : no dizziness [Easy Bleeding] : no tendency for easy bleeding [de-identified] : heavy menses

## 2023-04-20 NOTE — CONSULT LETTER
[Dear  ___] : Dear  [unfilled], [Consult Letter:] : I had the pleasure of evaluating your patient, [unfilled]. [Please see my note below.] : Please see my note below. [Consult Closing:] : Thank you very much for allowing me to participate in the care of this patient.  If you have any questions, please do not hesitate to contact me. [Sincerely,] : Sincerely, [FreeTextEntry3] : John Ayala DO\par Attending Physician,\par Hematology/ Medical Oncology\par [phone] office\par \par

## 2023-04-20 NOTE — HISTORY OF PRESENT ILLNESS
[Home] : at home, [unfilled] , at the time of the visit. [Medical Office: ([facility])___] : at the medical office located in  [Verbal consent obtained from patient] : the patient, [unfilled] [de-identified] : Ms. AMINATA GARCIA is a 27 year old female here today for evaluation and management of thrombocytosis.\par \par She was referred by Dr. Stephens.  She has been following with him for about 3 years now.  Aminata is a 26yo F with PMHx including Ulcerative Colitis.  She states her PLTs were elevated for the last 2-3 blood checks but she was only just notified of this issue.  She denies any headaches, CP, palpitations or bleeding.  She is not on ASA.  She does report AUB in the form of menorrhagia, which is likely why her iron stores are borderline.  \par \par LAB WORKUP\par (9.4.2020) WBC 7.2, Hgb 12.4, MCV 91.4, , Vit B12 is 451, ironsat 26%, ferritin 35, TSH 0.64, Vit D is 23\par (2018) \par (2017)  [de-identified] : 10/30/2020\par Patient is here for a follow-up visit for thrombocytosis, accompanied by family member.  She is feeling well with no new complaints.   Patient denies fever, chills, nausea, vomiting, CP or frequent headaches.  Reviewed most recent imaging which shows evidence of fatty liver.  She does report her regular heavy menses but no other bleeding.  \par US Abd (10.2.2020 - RR)IMPRESSION:  Hepatic steatosis. Otherwise, unremarkable exam.\par \par 7/5/22\par Patient is here for a follow-up visit for thrombocytosis, accompanied by mother.  Reviewed most recent CBC which shows neutrophil-predominant leukocytosis without evidence of thrombocytosis.  She states that 2 weeks ago she had PLT count up to 547K about 2 weeks ago with PCP.  She follows with GYN who recently diagnosed her with uterine fibroids.  Last UC flare was approximately 4 months ago.  Patient denies fever, chills, nausea, vomiting, CP or frequent headaches.  She still endorses AUB with menorrhagia , changes tampon up to 8x/day.

## 2023-06-26 ENCOUNTER — LABORATORY RESULT (OUTPATIENT)
Age: 28
End: 2023-06-26

## 2023-06-26 ENCOUNTER — APPOINTMENT (OUTPATIENT)
Dept: OBGYN | Facility: CLINIC | Age: 28
End: 2023-06-26
Payer: MEDICAID

## 2023-06-26 VITALS
HEART RATE: 101 BPM | DIASTOLIC BLOOD PRESSURE: 87 MMHG | SYSTOLIC BLOOD PRESSURE: 128 MMHG | HEIGHT: 66 IN | BODY MASS INDEX: 26.52 KG/M2 | WEIGHT: 165 LBS

## 2023-06-26 DIAGNOSIS — Z01.411 ENCOUNTER FOR GYNECOLOGICAL EXAMINATION (GENERAL) (ROUTINE) WITH ABNORMAL FINDINGS: ICD-10-CM

## 2023-06-26 DIAGNOSIS — D25.9 LEIOMYOMA OF UTERUS, UNSPECIFIED: ICD-10-CM

## 2023-06-26 DIAGNOSIS — N85.2 HYPERTROPHY OF UTERUS: ICD-10-CM

## 2023-06-26 DIAGNOSIS — Z78.9 OTHER SPECIFIED HEALTH STATUS: ICD-10-CM

## 2023-06-26 DIAGNOSIS — R31.29 OTHER MICROSCOPIC HEMATURIA: ICD-10-CM

## 2023-06-26 LAB
BILIRUB UR QL STRIP: NORMAL
CLARITY UR: CLEAR
COLLECTION METHOD: NORMAL
GLUCOSE UR-MCNC: NORMAL
HCG UR QL: 0.2 EU/DL
HGB UR QL STRIP.AUTO: NORMAL
KETONES UR-MCNC: NORMAL
LEUKOCYTE ESTERASE UR QL STRIP: NORMAL
NITRITE UR QL STRIP: NORMAL
PH UR STRIP: 7
PROT UR STRIP-MCNC: NORMAL
SP GR UR STRIP: 1.01

## 2023-06-26 PROCEDURE — 76830 TRANSVAGINAL US NON-OB: CPT

## 2023-06-26 PROCEDURE — 81003 URINALYSIS AUTO W/O SCOPE: CPT | Mod: QW

## 2023-06-26 PROCEDURE — 99395 PREV VISIT EST AGE 18-39: CPT | Mod: 25

## 2023-06-26 NOTE — PROCEDURE
[Cervical Pap Smear] : cervical Pap smear [Liquid Base] : liquid base [GC & Chlamydia via Pap] : GC & Chlamydia via Pap [Tolerated Well] : the patient tolerated the procedure well [No Complications] : there were no complications [Fibroid Uterus] : fibroid uterus [Transvaginal Ultrasound] : transvaginal ultrasound [Anteverted] : anteverted [L: ___ cm] : L: [unfilled] cm [W: ___cm] : W: [unfilled] cm [H: ___ cm] : H: [unfilled] cm [FreeTextEntry5] : fundal fibroid 24.84cc         EMS 12.55mm [FreeTextEntry7] : 2.75cc partially obscured by fibroid [FreeTextEntry8] : 4.31cc

## 2023-06-26 NOTE — PHYSICAL EXAM
A/P:  Noelle Collier is a 72 year old with a significant history for hypothyroidism due to Hashimoto's thyroiditis, hypertension, hyperlipidemia,  presenting in office for an initial consultation of hypothyroidism.   Patient has given consent to record this visit for documentation in their clinical record.     Hypothyroidism due to Hashimoto's thyroiditis:  Lab Results   Component Value Date    TSH 1.608 11/01/2022   Reviewed and discussed previous reports.  Ordered thyroid antibodies, thyroid stimulating hormone reflex, to be done today and repeated a week prior to next visit. Labs ordered for today and will be reviewed with the patient.  Continue levothyroxine 88 mcg half tablet daily. Take Levothyroxine in the morning on an empty stomach and wait 1 hour before eating. Wait 4 hours before taking any calcium, multivitamins, acid suppressor pills, iron pills, sucralfate, or raloxifene.  Follow up in 3 months with blood work done prior to next visit.    Primary hypertension:  Her blood pressure measured in office today is elevated at 156/82 mmHg.  Continue omlesartan 20 mg once daily.  Will continue to monitor.     Hyperlipidemia:  Continue rosuvasatatin 10 mg once daily.  Will continue to monitor.    Excess weight:  Her weight is 167 lb and BMI is 28.67.   Recommended lifestyle modification including diet and exercise. Monitor calorie intake and cut back to 500 calories per day, to lose 1-2 lb per week and 4-8 lb per month as recommended [employer] Clinical Endocrinology.  Advised to exercise 5-10 minutes a day and increase every day. Goal should be 30-60 minutes per day five days a week.   Recommended using stationery bicycle, swimming for 3-6 months for weight loss benefits.  Advised to include more fibers like green leafy vegetables, salad, beans, fish, egg white in salads and all colorful fruits, hard fruits as they have fructose in it.   Avoid sweet drinks other than water. Can drink tea and coffee.  Avoid pastas, soda, fast food, pizza.   Will continue to monitor.      Chief Complaint   Patient presents with   • New Patient     Hashimoto's thyroiditis     HPI:  Noelle Collier is a 72 year old with a significant history for hypothyroidism due to Hashimoto's thyroiditis, hypertension, hyperlipidemia,  presenting in office for an initial consultation of hypothyroidism.     She has a history of hypothyroidism due to Hashimoto's thyroiditis. Diagnosed 5 years ago. She is on levothyroxine 88 mcg, half tablet daily, takes it early in morning since 6/18/2020. Her previous endocrinologist has retired, last visited on 5/14/2021. She previously also visited Dr. Violette Atkins. She complains of weakness. She states she has not visited her endocrinologist since 1/2022. She requests for refills. Her daughter has thyroid issue too.    She has a history of primary hypertension and she on olmesartan 20 mg once daily.    She has a history of hyperlipidemia and is on, Crestor 10 mg once daily. She is on cholesterol medicine which was prescribed by her cardiologist. She visits her cardiologist every 6 months. She request for refills. She denies alcohol as her cholesterol is unstable.     She has a history of excess weight. She complains of weight gain since 5-6 years. She mentions of walking a lot. She does not eat fish.    Denies symptoms of COVID-19 including fever, cough, dyspnea, and denies any recent travels. Patient denies having come in contact with anyone who has tested positive.      No past medical history on file.    Current Outpatient Medications   Medication Sig Dispense Refill   • olmesartan (BENICAR) 20 MG tablet Take 1 tablet by mouth daily.     • rosuvastatin (CRESTOR) 10 MG tablet Take 1 tablet by mouth daily.     • levothyroxine 88 MCG tablet Take 1/2 tab daily early in the morning Empty stomach 45 tablet 3     No current facility-administered medications for this visit.       No family history on file.    Social  History     Socioeconomic History   • Marital status: Not on file     Spouse name: Not on file   • Number of children: Not on file   • Years of education: Not on file   • Highest education level: Not on file   Occupational History   • Not on file   Tobacco Use   • Smoking status: Not on file   • Smokeless tobacco: Not on file   Substance and Sexual Activity   • Alcohol use: Not on file   • Drug use: Not on file   • Sexual activity: Not on file   Other Topics Concern   • Not on file   Social History Narrative   • Not on file     Social Determinants of Health     Financial Resource Strain: Not on file   Food Insecurity: Not on file   Transportation Needs: Not on file   Physical Activity: Not on file   Stress: Not on file   Social Connections: Not on file   Intimate Partner Violence: Not on file       Review of Systems   Constitutional: Positive for unexpected weight change.   Respiratory: Negative for cough, shortness of breath and wheezing.    All other 12 points of ROS reviewed are negative except those mentioned in the HPI.      BP (!) 156/82   Pulse 68   Temp 98.3 °F (36.8 °C)   Resp 16   Ht 5' 4\"   Wt 75.8 kg (167 lb)   BMI 28.67 kg/m²   BSA 1.81 m²     P/E  General: Wearing a face mask  HEENT: EOMI, No exophthalmos, no palpable Lymph node  Neck: No visible thyroid enlargement or palpable Thyroid Nodule  Chest: No labor respiration, Clear to Auscultation  CVS: No tachycardia noted or murmer noted  Abdomen: Not distended abdomen   Neurologic: Pt is awake, alert, oriented x 3  MSK: Moves all extremities   Skin: No visible rash  Pysch: Pleasant  Speech: No dysarthria      Thank you for allowing me to participate in the care of the patient and for consultation. If any of the questions are unanswered please don't hesitate to give me a call.     Emily Fleming MD, F.A.C.E;F.A.C.P  Asst. Professor of Medicine, [facility]  Staff Endocrinologist, [facility]  [place], [place]      Note: a  copy of this note will be sent to referring physician.     I,  Dr. Monica Bowles, have created a visit summary document based on the audio recording between Dr. Alberto Fleming MD and this patient for the physician to review, edit as needed, and authenticate.  Creation Date: 11/2/2022          Provider Attestation  I have reviewed and edited the visit summary above and attest that it is accurate. The documentation recorded by the scribble accurately reflects the service I personally performed and the decisions made by me, Alberto Fleming     [Chaperone Present] : A chaperone was present in the examining room during all aspects of the physical examination [Appropriately responsive] : appropriately responsive [Alert] : alert [No Acute Distress] : no acute distress [Soft] : soft [Non-tender] : non-tender [Non-distended] : non-distended [Oriented x3] : oriented x3 [Examination Of The Breasts] : a normal appearance [No Masses] : no breast masses were palpable [Labia Majora] : normal [Labia Minora] : normal [Normal] : normal [Enlarged ___ wks] : enlarged [unfilled] ~Uweeks [Anteversion] : anteverted [Mass ___ cm] : a [unfilled] ~Ucm uterine mass was palpated [Uterine Adnexae] : normal

## 2023-06-28 LAB
APPEARANCE: CLEAR
BACTERIA UR CULT: NORMAL
BILIRUBIN URINE: NEGATIVE
BLOOD URINE: NEGATIVE
C TRACH RRNA SPEC QL NAA+PROBE: NOT DETECTED
COLOR: YELLOW
CYTOLOGY CVX/VAG DOC THIN PREP: NORMAL
GLUCOSE QUALITATIVE U: NEGATIVE MG/DL
HPV HIGH+LOW RISK DNA PNL CVX: NOT DETECTED
KETONES URINE: NEGATIVE MG/DL
LEUKOCYTE ESTERASE URINE: ABNORMAL
N GONORRHOEA RRNA SPEC QL NAA+PROBE: NOT DETECTED
NITRITE URINE: NEGATIVE
PH URINE: 7.5
PROTEIN URINE: NEGATIVE MG/DL
SOURCE AMPLIFICATION: NORMAL
SPECIFIC GRAVITY URINE: 1.01
UROBILINOGEN URINE: 0.2 MG/DL

## 2023-10-04 ENCOUNTER — APPOINTMENT (OUTPATIENT)
Dept: HEMATOLOGY ONCOLOGY | Facility: CLINIC | Age: 28
End: 2023-10-04

## 2023-10-11 ENCOUNTER — APPOINTMENT (OUTPATIENT)
Dept: HEMATOLOGY ONCOLOGY | Facility: CLINIC | Age: 28
End: 2023-10-11

## 2024-07-08 ENCOUNTER — APPOINTMENT (OUTPATIENT)
Dept: OBGYN | Facility: CLINIC | Age: 29
End: 2024-07-08
Payer: COMMERCIAL

## 2024-07-08 VITALS — HEIGHT: 66 IN | WEIGHT: 165 LBS | BODY MASS INDEX: 26.52 KG/M2

## 2024-07-08 DIAGNOSIS — D25.9 LEIOMYOMA OF UTERUS, UNSPECIFIED: ICD-10-CM

## 2024-07-08 DIAGNOSIS — Z01.419 ENCOUNTER FOR GYNECOLOGICAL EXAMINATION (GENERAL) (ROUTINE) W/OUT ABNORMAL FINDINGS: ICD-10-CM

## 2024-07-08 DIAGNOSIS — Z01.411 ENCOUNTER FOR GYNECOLOGICAL EXAMINATION (GENERAL) (ROUTINE) WITH ABNORMAL FINDINGS: ICD-10-CM

## 2024-07-08 PROCEDURE — 81003 URINALYSIS AUTO W/O SCOPE: CPT | Mod: QW

## 2024-07-08 PROCEDURE — 76830 TRANSVAGINAL US NON-OB: CPT

## 2024-07-10 LAB
C TRACH RRNA SPEC QL NAA+PROBE: NOT DETECTED
CYTOLOGY CVX/VAG DOC THIN PREP: ABNORMAL
HPV HIGH+LOW RISK DNA PNL CVX: NOT DETECTED
N GONORRHOEA RRNA SPEC QL NAA+PROBE: NOT DETECTED
SOURCE TP AMPLIFICATION: NORMAL

## 2025-01-16 ENCOUNTER — NON-APPOINTMENT (OUTPATIENT)
Age: 30
End: 2025-01-16

## 2025-01-23 ENCOUNTER — NON-APPOINTMENT (OUTPATIENT)
Age: 30
End: 2025-01-23

## 2025-01-23 ENCOUNTER — INPATIENT (INPATIENT)
Facility: HOSPITAL | Age: 30
LOS: 0 days | Discharge: HOME | DRG: 819 | End: 2025-01-24
Attending: OBSTETRICS & GYNECOLOGY | Admitting: OBSTETRICS & GYNECOLOGY
Payer: COMMERCIAL

## 2025-01-23 VITALS
RESPIRATION RATE: 18 BRPM | OXYGEN SATURATION: 100 % | HEART RATE: 104 BPM | DIASTOLIC BLOOD PRESSURE: 91 MMHG | SYSTOLIC BLOOD PRESSURE: 148 MMHG | TEMPERATURE: 97 F | WEIGHT: 164.91 LBS

## 2025-01-23 DIAGNOSIS — N93.9 ABNORMAL UTERINE AND VAGINAL BLEEDING, UNSPECIFIED: ICD-10-CM

## 2025-01-23 LAB
ALBUMIN SERPL ELPH-MCNC: 5 G/DL — SIGNIFICANT CHANGE UP (ref 3.5–5.2)
ALP SERPL-CCNC: 65 U/L — SIGNIFICANT CHANGE UP (ref 30–115)
ALT FLD-CCNC: 21 U/L — SIGNIFICANT CHANGE UP (ref 0–41)
ANION GAP SERPL CALC-SCNC: 13 MMOL/L — SIGNIFICANT CHANGE UP (ref 7–14)
APPEARANCE UR: ABNORMAL
AST SERPL-CCNC: 21 U/L — SIGNIFICANT CHANGE UP (ref 0–41)
BACTERIA # UR AUTO: ABNORMAL /HPF
BASOPHILS # BLD AUTO: 0.03 K/UL — SIGNIFICANT CHANGE UP (ref 0–0.2)
BASOPHILS NFR BLD AUTO: 0.2 % — SIGNIFICANT CHANGE UP (ref 0–1)
BILIRUB SERPL-MCNC: 0.7 MG/DL — SIGNIFICANT CHANGE UP (ref 0.2–1.2)
BILIRUB UR-MCNC: NEGATIVE — SIGNIFICANT CHANGE UP
BLD GP AB SCN SERPL QL: SIGNIFICANT CHANGE UP
BUN SERPL-MCNC: 10 MG/DL — SIGNIFICANT CHANGE UP (ref 10–20)
CALCIUM SERPL-MCNC: 9.7 MG/DL — SIGNIFICANT CHANGE UP (ref 8.4–10.5)
CAST: 0 /LPF — SIGNIFICANT CHANGE UP (ref 0–4)
CHLORIDE SERPL-SCNC: 101 MMOL/L — SIGNIFICANT CHANGE UP (ref 98–110)
CO2 SERPL-SCNC: 22 MMOL/L — SIGNIFICANT CHANGE UP (ref 17–32)
COLOR SPEC: ABNORMAL
CREAT SERPL-MCNC: 0.6 MG/DL — LOW (ref 0.7–1.5)
DIFF PNL FLD: ABNORMAL
EGFR: 123 ML/MIN/1.73M2 — SIGNIFICANT CHANGE UP
EOSINOPHIL # BLD AUTO: 0.06 K/UL — SIGNIFICANT CHANGE UP (ref 0–0.7)
EOSINOPHIL NFR BLD AUTO: 0.4 % — SIGNIFICANT CHANGE UP (ref 0–8)
GLUCOSE SERPL-MCNC: 78 MG/DL — SIGNIFICANT CHANGE UP (ref 70–99)
GLUCOSE UR QL: NEGATIVE MG/DL — SIGNIFICANT CHANGE UP
HCG SERPL QL: POSITIVE
HCG SERPL-ACNC: 5502 MIU/ML — HIGH
HCT VFR BLD CALC: 37.7 % — SIGNIFICANT CHANGE UP (ref 37–47)
HGB BLD-MCNC: 13 G/DL — SIGNIFICANT CHANGE UP (ref 12–16)
IMM GRANULOCYTES NFR BLD AUTO: 0.4 % — HIGH (ref 0.1–0.3)
KETONES UR-MCNC: NEGATIVE MG/DL — SIGNIFICANT CHANGE UP
LEUKOCYTE ESTERASE UR-ACNC: ABNORMAL
LIDOCAIN IGE QN: 33 U/L — SIGNIFICANT CHANGE UP (ref 7–60)
LYMPHOCYTES # BLD AUTO: 18.8 % — LOW (ref 20.5–51.1)
LYMPHOCYTES # BLD AUTO: 2.67 K/UL — SIGNIFICANT CHANGE UP (ref 1.2–3.4)
MCHC RBC-ENTMCNC: 30.4 PG — SIGNIFICANT CHANGE UP (ref 27–31)
MCHC RBC-ENTMCNC: 34.5 G/DL — SIGNIFICANT CHANGE UP (ref 32–37)
MCV RBC AUTO: 88.1 FL — SIGNIFICANT CHANGE UP (ref 81–99)
MONOCYTES # BLD AUTO: 1.08 K/UL — HIGH (ref 0.1–0.6)
MONOCYTES NFR BLD AUTO: 7.6 % — SIGNIFICANT CHANGE UP (ref 1.7–9.3)
NEUTROPHILS # BLD AUTO: 10.31 K/UL — HIGH (ref 1.4–6.5)
NEUTROPHILS NFR BLD AUTO: 72.6 % — SIGNIFICANT CHANGE UP (ref 42.2–75.2)
NITRITE UR-MCNC: NEGATIVE — SIGNIFICANT CHANGE UP
NRBC # BLD: 0 /100 WBCS — SIGNIFICANT CHANGE UP (ref 0–0)
NRBC BLD-RTO: 0 /100 WBCS — SIGNIFICANT CHANGE UP (ref 0–0)
PH UR: 5.5 — SIGNIFICANT CHANGE UP (ref 5–8)
PLATELET # BLD AUTO: 469 K/UL — HIGH (ref 130–400)
PMV BLD: 9.3 FL — SIGNIFICANT CHANGE UP (ref 7.4–10.4)
POTASSIUM SERPL-MCNC: 4.4 MMOL/L — SIGNIFICANT CHANGE UP (ref 3.5–5)
POTASSIUM SERPL-SCNC: 4.4 MMOL/L — SIGNIFICANT CHANGE UP (ref 3.5–5)
PROT SERPL-MCNC: 7.5 G/DL — SIGNIFICANT CHANGE UP (ref 6–8)
PROT UR-MCNC: 300 MG/DL
RBC # BLD: 4.28 M/UL — SIGNIFICANT CHANGE UP (ref 4.2–5.4)
RBC # FLD: 12.5 % — SIGNIFICANT CHANGE UP (ref 11.5–14.5)
RBC CASTS # UR COMP ASSIST: 250 /HPF — HIGH (ref 0–4)
SODIUM SERPL-SCNC: 136 MMOL/L — SIGNIFICANT CHANGE UP (ref 135–146)
SP GR SPEC: 1.01 — SIGNIFICANT CHANGE UP (ref 1–1.03)
SQUAMOUS # UR AUTO: >36 /HPF — HIGH (ref 0–5)
UROBILINOGEN FLD QL: 0.2 MG/DL — SIGNIFICANT CHANGE UP (ref 0.2–1)
WBC # BLD: 14.21 K/UL — HIGH (ref 4.8–10.8)
WBC # FLD AUTO: 14.21 K/UL — HIGH (ref 4.8–10.8)
WBC UR QL: 120 /HPF — HIGH (ref 0–5)

## 2025-01-23 PROCEDURE — 99222 1ST HOSP IP/OBS MODERATE 55: CPT

## 2025-01-23 PROCEDURE — 36415 COLL VENOUS BLD VENIPUNCTURE: CPT

## 2025-01-23 PROCEDURE — 76856 US EXAM PELVIC COMPLETE: CPT | Mod: 26

## 2025-01-23 PROCEDURE — 88305 TISSUE EXAM BY PATHOLOGIST: CPT

## 2025-01-23 PROCEDURE — 76830 TRANSVAGINAL US NON-OB: CPT | Mod: 26

## 2025-01-23 PROCEDURE — 84702 CHORIONIC GONADOTROPIN TEST: CPT

## 2025-01-23 PROCEDURE — C9399: CPT

## 2025-01-23 PROCEDURE — 99285 EMERGENCY DEPT VISIT HI MDM: CPT

## 2025-01-23 PROCEDURE — 76830 TRANSVAGINAL US NON-OB: CPT

## 2025-01-23 RX ORDER — SODIUM CHLORIDE 9 G/ML
1000 INJECTION, SOLUTION INTRAVENOUS ONCE
Refills: 0 | Status: COMPLETED | OUTPATIENT
Start: 2025-01-23 | End: 2025-01-23

## 2025-01-23 RX ORDER — ONDANSETRON 4 MG/1
4 TABLET, ORALLY DISINTEGRATING ORAL ONCE
Refills: 0 | Status: DISCONTINUED | OUTPATIENT
Start: 2025-01-23 | End: 2025-01-24

## 2025-01-23 RX ORDER — IBUPROFEN 600 MG/1
600 TABLET, FILM COATED ORAL EVERY 6 HOURS
Refills: 0 | Status: DISCONTINUED | OUTPATIENT
Start: 2025-01-23 | End: 2025-01-24

## 2025-01-23 RX ORDER — ACETAMINOPHEN 160 MG/5ML
650 SUSPENSION ORAL EVERY 6 HOURS
Refills: 0 | Status: DISCONTINUED | OUTPATIENT
Start: 2025-01-23 | End: 2025-01-24

## 2025-01-23 RX ORDER — ACETAMINOPHEN 160 MG/5ML
650 SUSPENSION ORAL ONCE
Refills: 0 | Status: COMPLETED | OUTPATIENT
Start: 2025-01-23 | End: 2025-01-23

## 2025-01-23 RX ADMIN — SODIUM CHLORIDE 1000 MILLILITER(S): 9 INJECTION, SOLUTION INTRAVENOUS at 14:00

## 2025-01-23 RX ADMIN — ACETAMINOPHEN 650 MILLIGRAM(S): 160 SUSPENSION ORAL at 14:00

## 2025-01-23 NOTE — H&P ADULT - ATTENDING COMMENTS
Pt to ER with c/o n/v pt has had pos preg test, not seen by dr yet, pt is also fasting       pt met  above reviewed  options  1-laparoscopy r/b  2-observation repeat bhcg in house    pt wants option 2-aware still may need laparoscopy  all questions answered

## 2025-01-23 NOTE — ED PROVIDER NOTE - CLINICAL SUMMARY MEDICAL DECISION MAKING FREE TEXT BOX
39-year-old female presents to the ED with chief complaint of left lower pelvic pain.  Seen in [facility] 2 weeks ago for vaginal bleeding and noted to have possible miscarriage.  Patient presents to the ED today due to persistent spotting.  LMP is in November.  Physical exam with a soft abdomen with tenderness to the lower pelvic area.  Pelvic exam deferred.  We obtained labs along with ultrasound.  Labs revealed mild leukocytosis with a positive hCG at 5500.  As per patient she reports her previous result was 800.  Transvaginal ultrasound revealed trace pelvic free fluid with uterine fibroids.  OB/GYN consulted.  Admitted to GYN

## 2025-01-23 NOTE — H&P ADULT - NSHPLABSRESULTS_GEN_ALL_CORE
Vital Signs Last 24 Hrs  T(F): 97.2 (2025 13:50), Max: 97.2 (2025 13:50)  HR: 104 (2025 13:50) (104 - 104)  BP: 148/91 (2025 13:50) (148/91 - 148/91)  RR: 18 (2025 13:50) (18 - 18)    LABS:                        13.0   14.21 )-----------( 469      ( 2025 15:21 )             37.7     HCG Quantitative, Serum: 5502.0 mIU/mL (25 @ 15:21)    ABO RH Interpretation: O POS (25 @ 15:21)  Antibody Screen: NEG (25 @ 15:21)        136  |  101  |  10  ----------------------------<  78  4.4   |  22  |  0.6[L]    Ca    9.7      2025 15:21    TPro  7.5  /  Alb  5.0  /  TBili  0.7  /  DBili  x   /  AST  21  /  ALT  21  /  AlkPhos  65        Urinalysis Basic - ( 2025 15:40 )    Color: Red / Appearance: Cloudy / S.009 / pH: x  Gluc: x / Ketone: Negative mg/dL  / Bili: Negative / Urobili: 0.2 mg/dL   Blood: x / Protein: 300 mg/dL / Nitrite: Negative   Leuk Esterase: Moderate / RBC: 250 /HPF /  /HPF   Sq Epi: x / Non Sq Epi: >36 /HPF / Bacteria: Moderate /HPF      RADIOLOGY & ADDITIONAL STUDIES:  < from: US Transvaginal (25 @ 16:10) >      ACC: [id number] EXAM:  US PELVIC COMPLETE   ORDERED BY: ZAHIRA COOLEY     ACC: [id number] EXAM:  US TRANSVAGINAL   ORDERED BY: ZAHIRA COOLEY     PROCEDURE DATE:  2025          INTERPRETATION:  CLINICAL INFORMATION: Vaginal bleeding. History of   miscarriage 2 weeks ago.    COMPARISON: None available.    TECHNIQUE:  Endovaginal and transabdominal pelvic sonogram. Color and Spectral   Doppler was performed.    FINDINGS:  Uterus: 8.7 cm x 8.6 cm x 5.7 cm. Fibroids measuring up to 5.9 cm.  Endometrium: Endometrium appears unremarkable measuring up to 10 mm in   thickness.    Right ovary: 3.5 cm x 1.9 cm x 2.2 cm. Within normal limits.  Left ovary: Nonvisualized.    Fluid: Trace    IMPRESSION:  Uterine fibroids    Trace free pelvic fluid    --- End of Report ---    ANDRA PADILLA MD; Attending Radiologist  This document has been electronically signed. 2025  6:19PM    < end of copied text >

## 2025-01-23 NOTE — PATIENT PROFILE ADULT - FALL HARM RISK - UNIVERSAL INTERVENTIONS
Bed in lowest position, wheels locked, appropriate side rails in place/Call bell, personal items and telephone in reach/Instruct patient to call for assistance before getting out of bed or chair/Non-slip footwear when patient is out of bed/[place] to call system/Physically safe environment - no spills, clutter or unnecessary equipment/Purposeful Proactive Rounding/Room/bathroom lighting operational, light cord in reach

## 2025-01-23 NOTE — ED ADULT TRIAGE NOTE - CHIEF COMPLAINT QUOTE
Pt c/o abdominal pain x2 days and back pain. Pt states she miscarried 2 weeks ago and was 5 weeks pregnant. Pt also endorses vaginal bleeding.
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
## 2025-01-23 NOTE — ED PROVIDER NOTE - OBJECTIVE STATEMENT
Pt is a 29 y.o Female  , uterine fibroids, presenting to the ED with chief complaint of left lower pelvic pain starting last night associated with nausea. Of note, pt was seen at [facility] 2 weeks ago for vaginal bleeding and "vaginal tissue loss" found to be pregnant and loss of products of conception. Pt states she has been spotting brown blood since and today had vaginal bleeding which she thinks may be the start of her menstrual cycle. Pt states her LMP was early November. Denies any fever chills, trauma, dysuria, smoking alochol or recreational drugs. Denies hx of STDs. Pt has not seen [facility] for this past pregnancy.

## 2025-01-23 NOTE — H&P ADULT - NSHPREVIEWOFSYSTEMS_GEN_ALL_CORE
REVIEW OF SYSTEMS:  CONSTITUTIONAL: No weakness, fevers or chills  EYES/ENT: No visual changes;  No vertigo or throat pain   NECK: No pain or stiffness  RESPIRATORY: No cough, wheezing, hemoptysis; No shortness of breath  CARDIOVASCULAR: No chest pain or palpitations  GASTROINTESTINAL: + lower abdominal pain. No epigastric pain. No nausea, vomiting, or hematemesis; No diarrhea or constipation. No melena or hematochezia.  GENITOURINARY: No dysuria, frequency or hematuria  NEUROLOGICAL: No numbness or weakness  SKIN: No itching, rashes

## 2025-01-23 NOTE — ED PROVIDER NOTE - PHYSICAL EXAMINATION
VITAL SIGNS: noted  CONSTITUTIONAL: Well-developed; well-nourished; in no acute distress  HEAD: Normocephalic; atraumatic  EYES: PERRL, EOM intact; conjunctiva and sclera clear  ENT: No nasal discharge;, MMM, oropharynx clear without tonsillar hypertrophy or exudates  NECK: Supple; non tender. No anterior cervical lymphadenopathy noted  CARD: S1, S2 normal; no murmurs, gallops, or rubs. Regular rate and rhythm  RESP: CTAB/L, no wheezes, rales or rhonchi  ABD: Normal bowel sounds; soft; diffuse lower abdominal pain no rebound no guarding  no organoomegaly. No CVA tenderness  EXT: Normal ROM. No calf tenderness or edema. Distal pulses intact  NEURO: Awake and alert, oriented. Grossly unremarkable. No focal deficits.  SKIN: Skin exam is warm and dry, no acute rash

## 2025-01-23 NOTE — H&P ADULT - NSHPPHYSICALEXAM_GEN_ALL_CORE
General: AAOx3, NAD  Abdomen: Soft, moderate right lower quadrant pain, no left lower quadrant test  GYN/Pelvis:  External genitalia: normal  Vagina: 10cc dark red blood in vaginal vault  Cervix: soft, closed  Uterus: nontender  Adnexa: no adnexal tenderness General: AAOx3, NAD  Abdomen: Soft, moderate right lower quadrant pain, no left lower quadrant test  Back: No CVAT  GYN/Pelvis:  External genitalia: normal  Vagina: 10cc dark red blood in vaginal vault  Cervix: soft, closed  Uterus: nontender  Adnexa: no adnexal tenderness

## 2025-01-23 NOTE — H&P ADULT - HISTORY OF PRESENT ILLNESS
28 y/o , LMP  presents with vaginal pain that began yesterday and bilateral lower abdominal pain that began this AM. Seen in [facility] ED  for suspected miscarriage, had an episode of heavy bleeding and passage of tissue and then went to [facility]. TVUS was negative and bhcg at that time was approx 800. No POC was sent for pathology. Started having some bleeding today. Denies fevers, chills, N/V. Pain worse with laying down, initially was on left now on right radiating around right flank. Denies dysuria, hematuria. Unplanned, desired pregnancy.     PMHx: fibroids

## 2025-01-23 NOTE — H&P ADULT - ASSESSMENT
30 y/o , LMP  with pregnancy of unknown location, currently clinically and hemodynamically stable.     -admit to Dr. Rodriguez  -repeat bhcg 24hrs from previous  -consider repeat TVUS tomorrow  -diet: regular, NPO after breakfast   -activity: ambulate as tolerated  -monitor vitals q4 hours  -pain management: tylenol/motrin PRN  -encourage PO hydration

## 2025-01-24 ENCOUNTER — TRANSCRIPTION ENCOUNTER (OUTPATIENT)
Age: 30
End: 2025-01-24

## 2025-01-24 ENCOUNTER — RESULT REVIEW (OUTPATIENT)
Age: 30
End: 2025-01-24

## 2025-01-24 VITALS
OXYGEN SATURATION: 98 % | DIASTOLIC BLOOD PRESSURE: 56 MMHG | HEART RATE: 89 BPM | SYSTOLIC BLOOD PRESSURE: 107 MMHG | RESPIRATION RATE: 17 BRPM

## 2025-01-24 DIAGNOSIS — O00.90 UNSPECIFIED ECTOPIC PREGNANCY WITHOUT INTRAUTERINE PREGNANCY: ICD-10-CM

## 2025-01-24 LAB
CULTURE RESULTS: SIGNIFICANT CHANGE UP
HCG SERPL-ACNC: 6024 MIU/ML — HIGH
SPECIMEN SOURCE: SIGNIFICANT CHANGE UP

## 2025-01-24 PROCEDURE — 76830 TRANSVAGINAL US NON-OB: CPT | Mod: 26

## 2025-01-24 PROCEDURE — 59151 TREAT ECTOPIC PREGNANCY: CPT

## 2025-01-24 PROCEDURE — 88305 TISSUE EXAM BY PATHOLOGIST: CPT | Mod: 26

## 2025-01-24 PROCEDURE — 99232 SBSQ HOSP IP/OBS MODERATE 35: CPT | Mod: 57

## 2025-01-24 RX ORDER — SODIUM CHLORIDE 9 G/ML
1000 INJECTION, SOLUTION INTRAVENOUS
Refills: 0 | Status: DISCONTINUED | OUTPATIENT
Start: 2025-01-24 | End: 2025-01-24

## 2025-01-24 RX ORDER — ACETAMINOPHEN 160 MG/5ML
1 SUSPENSION ORAL
Qty: 60 | Refills: 0
Start: 2025-01-24

## 2025-01-24 RX ORDER — IBUPROFEN 600 MG/1
1 TABLET, FILM COATED ORAL
Qty: 30 | Refills: 0
Start: 2025-01-24

## 2025-01-24 RX ORDER — OXYCODONE HYDROCHLORIDE 30 MG/1
1 TABLET ORAL
Qty: 5 | Refills: 0
Start: 2025-01-24

## 2025-01-24 RX ORDER — HYDROMORPHONE HYDROCHLORIDE 4 MG/ML
0.5 INJECTION, SOLUTION INTRAMUSCULAR; INTRAVENOUS; SUBCUTANEOUS
Refills: 0 | Status: DISCONTINUED | OUTPATIENT
Start: 2025-01-24 | End: 2025-01-24

## 2025-01-24 NOTE — PRE-OP CHECKLIST - AS TEMP SITE
Spoke to patient. Relayed Dr. Katharine Trinh' message below to patient. Patient verbalized understanding to entire message and states he will comply. Patient states he will call back to schedule his 6 week appointment. oral

## 2025-01-24 NOTE — BRIEF OPERATIVE NOTE - OPERATION/FINDINGS
Blood present in abdominal cavity, fibroid uterus, right tubal ectopic pregnancy, likely aborting. Right salpingectomy performed. Hemostasis noted.  Minimal Blood present in abdominal cavity, fibroid uterus, right tubal ectopic pregnancy. Right salpingectomy performed. Hemostasis noted.

## 2025-01-24 NOTE — PROVIDER CONTACT NOTE (OTHER) - SITUATION
while reviewing chart I saw pt is to be NPO after breakfast, but there is no order, I wanted to confirm plan, also can activity order be placed. lastly, pt has no order for DVT ppx, pharmacological or

## 2025-01-24 NOTE — DISCHARGE NOTE PROVIDER - CARE PROVIDER_API CALL
Rosa Bergeron  Obstetrics and Gynecology  [address] [place] [id number]  Phone: [phone]  Fax: [phone]  Follow Up Time:

## 2025-01-24 NOTE — PRE-OP CHECKLIST - 1.
Patient alert & oriented x4, denies any contacts, jewelry, removable dental work or undergarments. As per patient, no personal property brought to pre-op area; all belongings left on unit. Patient denies any implants/metals/or other internal prostheses.

## 2025-01-24 NOTE — DISCHARGE NOTE NURSING/CASE MANAGEMENT/SOCIAL WORK - PATIENT PORTAL LINK FT
You can access the FollowMyHealth Patient Portal offered by [facility] by registering at the following website: [email or web address]. By joining [facility] FollowMyHealth portal, you will also be able to view your health information using other applications (apps) compatible with our system.

## 2025-01-24 NOTE — PROGRESS NOTE ADULT - ATTENDING COMMENTS
28yo G10 with suspected ectopic pregnancy. Sonogram report and physical exam suggestive of ruptured ectopic pregnancy. Discussed with patient, given this finding, cannot recommend medical management of ectopic with methotrexate.   Discussed plan procedure for diagnostic laparoscopy, right or left salpingectomy for removal of ectopic pregnancy and possible dilation and curettage.   Discussed plan for 3 small incisions , removal of entire tube. R/B/A discussed and include bleeding/infection, possible ex-lap, possible bowel injury/ hemorrhage. Possibility of blood transfusion if needed for resuscitation. Discussed plan for d&c if no clear ectopic pregnancy is visualized during he procedure. All questions answered    -NPO/IVF  -consents signed  -anesthesia  -dvt ppx  -f/u patholgy

## 2025-01-24 NOTE — DISCHARGE NOTE PROVIDER - NSDCCPCAREPLAN_GEN_ALL_CORE_FT
PRINCIPAL DISCHARGE DIAGNOSIS  Diagnosis: Ectopic pregnancy, tubal  Assessment and Plan of Treatment:

## 2025-01-24 NOTE — DISCHARGE NOTE PROVIDER - HOSPITAL COURSE
29y  who presented with abdominal pain and vaginal bleeding, found to have ectopic pregnancy. s/p laparoscopic salpingectomy. 29y  who presented with abdominal pain and vaginal bleeding, found to have ectopic pregnancy. s/p right laparoscopic salpingectomy, EBL 5cc

## 2025-01-24 NOTE — DISCHARGE NOTE NURSING/CASE MANAGEMENT/SOCIAL WORK - NSDCPEFALRISK_GEN_ALL_CORE
[facility] For information on Fall & Injury Prevention, visit: [email or web address] OR  [email or web address] OR  https://www.cdc.gov/steadi/patient.html

## 2025-01-24 NOTE — PROGRESS NOTE ADULT - ASSESSMENT
28yo , LMP , with pregnancy of unknown location, currently clinically and hemodynamically stable.    -repeat hcg ordered for 1600  -consider repeat TVUS today  -diet regular, to be NPO after breakfast today  -activity: ambulated as tolerated  -monitor vitals q4hrs  -pain management with Tylenol and motrin PRN  -encourage PO hydration until NPO  -continue to monitor s/sx    To be discussed with Dr. Bergeron and Dr. Walton

## 2025-01-24 NOTE — PROGRESS NOTE ADULT - SUBJECTIVE AND OBJECTIVE BOX
PGY 1  GYN Progress Note    30yo , LMP , presented yesterday to ED with vaginal bleeding and bilateral lower abdominal pain.  Had suspected miscarriage on  at [facility] with passage of tissue and heavy vaginal bleeding.  Found to have a bhcg 873 with a negative TVUS.  Since admission, patient was found to have a bhcg of 5502 and a TVUS showing uterine fibroids and trace free pelvic fluid.  Overnight, patient slept comfortably and did not require pain medication.  Reporting improved lower abdominal pain, stating she is experiencing slight discomfort, now more RLQ > LLQ.   States she bleeding has greatly improved and is now experiencing some light spotting.  Denies fever, chills, lightheadedness, dizziness, chest pain, SOB, palpitations, n/v.    Physical exam:    Vital Signs Last 24 Hrs  T(F): 98 (2025 04:43), Max: 98 (2025 04:43)  HR: 91 (2025 04:43) (91 - 104)  BP: 109/74 (2025 04:43) (109/74 - 148/91)  RR: 18 (2025 04:43) (18 - 18)  SpO2: 99% (2025 04:43) (99% - 100%)    Gen: alert, oriented  CVS: RRR  Lungs: CTAB  Abdomen: Soft, mildly TTP in the RLQ, non distended. No rebound, rigidity or guarding. Incision clean, dry, intact  Perineum: no active bleeding. Felipe in place, clear urine  Ext: No calf tenderness    Diet:    MEDICATIONS  (STANDING):  influenza   Vaccine 0.5 milliLiter(s) IntraMuscular once  ondansetron Injectable 4 milliGRAM(s) IV Push once    MEDICATIONS  (PRN):  acetaminophen     Tablet .. 650 milliGRAM(s) Oral every 6 hours PRN Moderate Pain (4 - 6)  ibuprofen  Tablet. 600 milliGRAM(s) Oral every 6 hours PRN Moderate Pain (4 - 6)      LABS:                        13.0   14.21 )-----------( 469      ( 2025 15:21 )             37.7     Antibody Screen: NEG ( @ 15:21)    25 @ 15:21      136  |  101  |  10  ----------------------------<  78  4.4   |  22  |  0.6[L]        Ca    9.7      2025 15:21    TPro  7.5  /  Alb  5.0  /  TBili  0.7  /  DBili  x   /  AST  21  /  ALT  21  /  AlkPhos  65  25 @ 15:21      [facility] [place]    Urinalysis with Rflx Culture (collected 25 @ 15:40)      < from: US Transvaginal (25 @ 16:10) >  ACC: [id number] EXAM:  US PELVIC COMPLETE   ORDERED BY: ZAHIRA COOLEY     ACC: [id number] EXAM:  US TRANSVAGINAL   ORDERED BY: ZAHIRA COOLEY     PROCEDURE DATE:  2025          INTERPRETATION:  CLINICAL INFORMATION: Vaginal bleeding. History of   miscarriage 2 weeks ago.    COMPARISON: None available.    TECHNIQUE:  Endovaginal and transabdominal pelvic sonogram. Color and Spectral   Doppler was performed.    FINDINGS:  Uterus: 8.7 cm x 8.6 cm x 5.7 cm. Fibroids measuring up to 5.9 cm.  Endometrium: Endometrium appears unremarkable measuring up to 10 mm in   thickness.    Right ovary: 3.5 cm x 1.9 cm x 2.2 cm. Within normal limits.  Left ovary: Nonvisualized.    Fluid: Trace    IMPRESSION:  Uterine fibroids    Trace free pelvic fluid    --- End of Report ---            ANDRA PADILLA MD; Attending Radiologist  This document has been electronically signed. 2025  6:19PM    < end of copied text >     PGY 1  GYN Progress Note    28yo , LMP , presented yesterday to ED with vaginal bleeding and bilateral lower abdominal pain.  Had suspected miscarriage on  at [facility] with passage of tissue and heavy vaginal bleeding.  Found to have a bhcg 873 with a negative TVUS.  Since admission, patient was found to have a bhcg of 5502 and a TVUS showing uterine fibroids and trace free pelvic fluid.  Overnight, patient slept comfortably and did not require pain medication.  Reporting improved lower abdominal pain, stating she is experiencing slight discomfort, now more RLQ > LLQ.   States she bleeding has greatly improved and is now experiencing some light spotting.  Denies fever, chills, lightheadedness, dizziness, chest pain, SOB, palpitations, n/v.    Physical exam:    Vital Signs Last 24 Hrs  T(F): 98 (2025 04:43), Max: 98 (2025 04:43)  HR: 91 (2025 04:43) (91 - 104)  BP: 109/74 (2025 04:43) (109/74 - 148/91)  RR: 18 (2025 04:43) (18 - 18)  SpO2: 99% (2025 04:43) (99% - 100%)    Gen: alert, oriented  Abdomen: Soft, mildly TTP in the RLQ, non distended. No rebound, rigidity or guarding.   : mild vaginal spotting noted on vaginal pad of dark brown blood  Ext: No calf tenderness    Diet: regular until breakfast, then NPO    MEDICATIONS  (STANDING):  influenza   Vaccine 0.5 milliLiter(s) IntraMuscular once  ondansetron Injectable 4 milliGRAM(s) IV Push once    MEDICATIONS  (PRN):  acetaminophen     Tablet .. 650 milliGRAM(s) Oral every 6 hours PRN Moderate Pain (4 - 6)  ibuprofen  Tablet. 600 milliGRAM(s) Oral every 6 hours PRN Moderate Pain (4 - 6)      LABS:                        13.0   14.21 )-----------( 469      ( 2025 15:21 )             37.7     Antibody Screen: NEG ( @ 15:21)    25 @ 15:21      136  |  101  |  10  ----------------------------<  78  4.4   |  22  |  0.6[L]        Ca    9.7      2025 15:21    TPro  7.5  /  Alb  5.0  /  TBili  0.7  /  DBili  x   /  AST  21  /  ALT  21  /  AlkPhos  65  25 @ 15:21      [facility] [place] [address]    Urinalysis with Rflx Culture (collected 25 @ 15:40)      < from: US Transvaginal (25 @ 16:10) >  ACC: [id number] EXAM:  US PELVIC COMPLETE   ORDERED BY: ZAHIRA COOLEY     ACC: [id number] EXAM:  US TRANSVAGINAL   ORDERED BY: ZAHIRA COOLEY     PROCEDURE DATE:  2025          INTERPRETATION:  CLINICAL INFORMATION: Vaginal bleeding. History of   miscarriage 2 weeks ago.    COMPARISON: None available.    TECHNIQUE:  Endovaginal and transabdominal pelvic sonogram. Color and Spectral   Doppler was performed.    FINDINGS:  Uterus: 8.7 cm x 8.6 cm x 5.7 cm. Fibroids measuring up to 5.9 cm.  Endometrium: Endometrium appears unremarkable measuring up to 10 mm in   thickness.    Right ovary: 3.5 cm x 1.9 cm x 2.2 cm. Within normal limits.  Left ovary: Nonvisualized.    Fluid: Trace    IMPRESSION:  Uterine fibroids    Trace free pelvic fluid    --- End of Report ---            ANDRA PADILLA MD; Attending Radiologist  This document has been electronically signed. 2025  6:19PM    < end of copied text >

## 2025-01-24 NOTE — CHART NOTE - NSCHARTNOTEFT_GEN_A_CORE
PACU ANESTHESIA ADMISSION NOTE      Procedure: Exam under anesthesia, diagnostic laparoscopy, right salpingectomy and removal of ectopic pregnancy   Post op diagnosis:  Ectopic pregnancy     ____  Intubated  TV:______       Rate: ______      FiO2: ______    _x___  Patent Airway    _x___  Full return of protective reflexes    _x___  Full recovery from anesthesia / back to baseline status    Vitas Signs  Temp(F): 98.5  HR: 104  BP: 101/54  RR: 13  SpO2: 99%    Mental Status:  _x___ Awake   ___x__ Alert   _____ Drowsy   _____ Sedated    Nausea/Vomiting:  _x___  NO       ______Yes,   See Post - Op Orders         Pain Scale (0-10):  __0___    Treatment: _x___ None    ____ See Post - Op/PCA Orders    Post - Operative Fluids:   __x__ Oral   ____ See Post - Op Orders    Plan: Discharge:   _x___Home       _____Floor     _____Critical Care    _____  Other:_________________    Comments:  No anesthesia issues or complications noted.  Discharge when criteria met.

## 2025-01-24 NOTE — DISCHARGE NOTE NURSING/CASE MANAGEMENT/SOCIAL WORK - FINANCIAL ASSISTANCE
[facility] provides services at a reduced cost to those who are determined to be eligible through [facility]’s financial assistance program. Information regarding [facility]’s financial assistance program can be found by going to [email or web address] or by calling [phone].

## 2025-01-24 NOTE — PRE-OP CHECKLIST - SIDE RAILS UP
Other (Free Text): Brother Render Risk Assessment In Note?: no Detail Level: Detailed Note Text (......Xxx Chief Complaint.): This diagnosis correlates with the done

## 2025-01-24 NOTE — DISCHARGE NOTE PROVIDER - NSDCFUADDINST_GEN_ALL_CORE_FT
DIET  - You may resume your normal diet. Eat a well-balanced diet. You may prefer to eat light meals for the first few days after surgery.  Drink plenty of water (6-8 glasses a day).    -Take simethicone (Gas-X) to prevent gas pain every 4-6 hours for the first 3-4 days after surgery.    ACTIVITY:   - No heavy lifting/pushing/pulling for 2 weeks. Do not lift anything more than 10 lbs (such as laundry, groceries, children, pets), vacuum, push heavy doors or grocery carts, etc, for 6 weeks.  - You may climb stairs as tolerated.  -  Do not put anything in the vagina for at least 2 weeks after surgery unless otherwise instructed by your doctor (including tampons, douching, sexual intercourse, etc).  -  No driving for 1 week after surgery and not while taking narcotic pain medication. Drive defensively when you are ready.  -  Avoid sitting or lying in bed for more than 2 hours at a time while you are awake to reduce your risk of blood clots.    WOUND CARE: You have 3 incisions that are covered with surgical glue (Dermabond). Your umbilical incision has a dressing. In 24 hours you may take the dressing off and get your incisions wet.  Do not submerge in water (no tub baths or pools), showering is OK.  The Dermabond will loosen from the skin and fall off in 5 to 10 days.  Do not apply any ointments, lotions, creams or tape over the Dermabond.    PAIN MANAGEMENT:   Alternate Tylenol and ibuprofen/Motrin (if you are eligible). Each of these medications can be taken every six hours. Try to stagger them so that you are taking something for pain every three hours (ex. Take Motrin at 12:00, Tylenol at 3:00, Motrin at 6:00, etc.) to maximize pain relief.  - Tylenol – 975mg every 6 hours as needed. The maximum dose of Tylenol is 4000 mg in 24 hours.  - Motrin/Ibuprofen - 600-800mg mg every 6 hours as needed (try to take with food). The maximum dose of Motrin/ibuprofen is 2400mg in 24 hours  -  A warm shower, heating pad, and/or walking may help.    WHAT TO EXPECT AT HOME  - Recovery from surgery is generally 1-2 weeks, but sometimes longer for more strenuous activity. It is normal to be very tired during this time.  - It is normal to have some drainage or a small amount of vaginal bleeding after surgery that would require the use of a light pantiliner.   - You may experience gas pain, abdominal swelling, or shoulder pain for 24-72 hours after surgery. This is from the carbon dioxide gas put into your abdomen to better visualize your organs. A warm shower, heating pad, and/or walking may help. Taking simethicone (Gas-X) will help relieve this pain as well.    WHEN TO CALL YOUR DOCTOR:  - Fever (>100.4°F or 38.0°C) or chills  - Incision problems such as redness, warmth, swelling, or foul smelling drainage.  - Severe nausea or persistent vomiting.  - Bright red vaginal bleeding (soaking >1 pad/hour) or foul smelling vaginal drainage.  - Severe pain not relieved with pain medication.  - Pain with urination, cloudy urine, or foul smelling urine.  - Or if you have any other problems or questions.    Follow up with Dr. Bergeron in 2 weeks for postoperative check.

## 2025-01-27 PROBLEM — D21.9 BENIGN NEOPLASM OF CONNECTIVE AND OTHER SOFT TISSUE, UNSPECIFIED: Chronic | Status: ACTIVE | Noted: 2025-01-23

## 2025-01-29 LAB — SURGICAL PATHOLOGY STUDY: SIGNIFICANT CHANGE UP

## 2025-01-31 DIAGNOSIS — O00.111 RIGHT TUBAL PREGNANCY WITH INTRAUTERINE PREGNANCY: ICD-10-CM

## 2025-02-06 ENCOUNTER — APPOINTMENT (OUTPATIENT)
Dept: OBGYN | Facility: CLINIC | Age: 30
End: 2025-02-06
Payer: COMMERCIAL

## 2025-02-06 ENCOUNTER — OUTPATIENT (OUTPATIENT)
Dept: OUTPATIENT SERVICES | Facility: HOSPITAL | Age: 30
LOS: 1 days | End: 2025-02-06
Payer: COMMERCIAL

## 2025-02-06 VITALS — WEIGHT: 174 LBS | BODY MASS INDEX: 28.08 KG/M2 | SYSTOLIC BLOOD PRESSURE: 137 MMHG | DIASTOLIC BLOOD PRESSURE: 87 MMHG

## 2025-02-06 DIAGNOSIS — Z90.79 ACQUIRED ABSENCE OF OTHER GENITAL ORGAN(S): ICD-10-CM

## 2025-02-06 DIAGNOSIS — D21.9 BENIGN NEOPLASM OF CONNECTIVE AND OTHER SOFT TISSUE, UNSPECIFIED: ICD-10-CM

## 2025-02-06 DIAGNOSIS — Z98.890 OTHER SPECIFIED POSTPROCEDURAL STATES: ICD-10-CM

## 2025-02-06 PROCEDURE — 99213 OFFICE O/P EST LOW 20 MIN: CPT | Mod: 24

## 2025-02-06 PROCEDURE — 99213 OFFICE O/P EST LOW 20 MIN: CPT

## 2025-02-07 DIAGNOSIS — D21.9 BENIGN NEOPLASM OF CONNECTIVE AND OTHER SOFT TISSUE, UNSPECIFIED: ICD-10-CM

## 2025-02-07 DIAGNOSIS — Z98.890 OTHER SPECIFIED POSTPROCEDURAL STATES: ICD-10-CM

## 2025-02-07 DIAGNOSIS — Z90.79 ACQUIRED ABSENCE OF OTHER GENITAL ORGAN(S): ICD-10-CM

## 2025-02-27 ENCOUNTER — OUTPATIENT (OUTPATIENT)
Dept: OUTPATIENT SERVICES | Facility: HOSPITAL | Age: 30
LOS: 1 days | End: 2025-02-27
Payer: COMMERCIAL

## 2025-02-27 DIAGNOSIS — N97.9 FEMALE INFERTILITY, UNSPECIFIED: ICD-10-CM

## 2025-02-27 DIAGNOSIS — Z00.8 ENCOUNTER FOR OTHER GENERAL EXAMINATION: ICD-10-CM

## 2025-02-27 PROCEDURE — 58340 CATHETER FOR HYSTEROGRAPHY: CPT

## 2025-02-27 PROCEDURE — 74740 X-RAY FEMALE GENITAL TRACT: CPT

## 2025-02-28 DIAGNOSIS — N97.9 FEMALE INFERTILITY, UNSPECIFIED: ICD-10-CM

## 2025-04-16 ENCOUNTER — NON-APPOINTMENT (OUTPATIENT)
Age: 30
End: 2025-04-16

## 2025-07-14 ENCOUNTER — APPOINTMENT (OUTPATIENT)
Dept: OBGYN | Facility: CLINIC | Age: 30
End: 2025-07-14